# Patient Record
Sex: FEMALE | Race: OTHER | Employment: OTHER | URBAN - METROPOLITAN AREA
[De-identification: names, ages, dates, MRNs, and addresses within clinical notes are randomized per-mention and may not be internally consistent; named-entity substitution may affect disease eponyms.]

---

## 2017-01-14 ENCOUNTER — HOSPITAL ENCOUNTER (EMERGENCY)
Age: 77
Discharge: HOME | End: 2017-01-14
Attending: EMERGENCY MEDICINE
Payer: MEDICARE

## 2017-01-14 VITALS
WEIGHT: 160 LBS | SYSTOLIC BLOOD PRESSURE: 142 MMHG | RESPIRATION RATE: 18 BRPM | HEART RATE: 84 BPM | HEIGHT: 60 IN | BODY MASS INDEX: 31.41 KG/M2 | DIASTOLIC BLOOD PRESSURE: 70 MMHG | OXYGEN SATURATION: 99 % | TEMPERATURE: 97.7 F

## 2017-01-14 DIAGNOSIS — E11.65 TYPE 2 DIABETES MELLITUS WITH HYPERGLYCEMIA, WITHOUT LONG-TERM CURRENT USE OF INSULIN (HCC): Primary | ICD-10-CM

## 2017-01-14 LAB
ALBUMIN SERPL BCP-MCNC: 3.7 G/DL (ref 3.5–5)
ALBUMIN/GLOB SERPL: 0.9 {RATIO} (ref 1.1–2.2)
ALP SERPL-CCNC: 84 U/L (ref 45–117)
ALT SERPL-CCNC: 21 U/L (ref 12–78)
ANION GAP BLD CALC-SCNC: 11 MMOL/L (ref 5–15)
APPEARANCE UR: ABNORMAL
AST SERPL W P-5'-P-CCNC: 11 U/L (ref 15–37)
BACTERIA URNS QL MICRO: NEGATIVE /HPF
BASOPHILS # BLD AUTO: 0 K/UL (ref 0–0.1)
BASOPHILS # BLD: 0 % (ref 0–1)
BILIRUB SERPL-MCNC: 0.1 MG/DL (ref 0.2–1)
BILIRUB UR QL: NEGATIVE
BUN SERPL-MCNC: 23 MG/DL (ref 6–20)
BUN/CREAT SERPL: 21 (ref 12–20)
CALCIUM SERPL-MCNC: 9.3 MG/DL (ref 8.5–10.1)
CHLORIDE SERPL-SCNC: 101 MMOL/L (ref 97–108)
CK MB CFR SERPL CALC: 2 % (ref 0–2.5)
CK MB SERPL-MCNC: 1.1 NG/ML (ref 5–25)
CK SERPL-CCNC: 56 U/L (ref 26–192)
CO2 SERPL-SCNC: 25 MMOL/L (ref 21–32)
COLOR UR: ABNORMAL
CREAT SERPL-MCNC: 1.12 MG/DL (ref 0.55–1.02)
EOSINOPHIL # BLD: 0.1 K/UL (ref 0–0.4)
EOSINOPHIL NFR BLD: 1 % (ref 0–7)
EPITH CASTS URNS QL MICRO: ABNORMAL /LPF
ERYTHROCYTE [DISTWIDTH] IN BLOOD BY AUTOMATED COUNT: 13.7 % (ref 11.5–14.5)
GLOBULIN SER CALC-MCNC: 3.9 G/DL (ref 2–4)
GLUCOSE BLD STRIP.AUTO-MCNC: 204 MG/DL (ref 65–100)
GLUCOSE BLD STRIP.AUTO-MCNC: 297 MG/DL (ref 65–100)
GLUCOSE SERPL-MCNC: 304 MG/DL (ref 65–100)
GLUCOSE UR STRIP.AUTO-MCNC: >1000 MG/DL
HCT VFR BLD AUTO: 38.2 % (ref 35–47)
HGB BLD-MCNC: 12.5 G/DL (ref 11.5–16)
HGB UR QL STRIP: ABNORMAL
HYALINE CASTS URNS QL MICRO: ABNORMAL /LPF (ref 0–5)
KETONES UR QL STRIP.AUTO: NEGATIVE MG/DL
LEUKOCYTE ESTERASE UR QL STRIP.AUTO: NEGATIVE
LYMPHOCYTES # BLD AUTO: 51 % (ref 12–49)
LYMPHOCYTES # BLD: 3.2 K/UL (ref 0.8–3.5)
MCH RBC QN AUTO: 25.9 PG (ref 26–34)
MCHC RBC AUTO-ENTMCNC: 32.7 G/DL (ref 30–36.5)
MCV RBC AUTO: 79.3 FL (ref 80–99)
MONOCYTES # BLD: 0.5 K/UL (ref 0–1)
MONOCYTES NFR BLD AUTO: 8 % (ref 5–13)
NEUTS SEG # BLD: 2.6 K/UL (ref 1.8–8)
NEUTS SEG NFR BLD AUTO: 40 % (ref 32–75)
NITRITE UR QL STRIP.AUTO: NEGATIVE
PH UR STRIP: 5 [PH] (ref 5–8)
PLATELET # BLD AUTO: 215 K/UL (ref 150–400)
POTASSIUM SERPL-SCNC: 3.5 MMOL/L (ref 3.5–5.1)
PROT SERPL-MCNC: 7.6 G/DL (ref 6.4–8.2)
PROT UR STRIP-MCNC: NEGATIVE MG/DL
RBC # BLD AUTO: 4.82 M/UL (ref 3.8–5.2)
RBC #/AREA URNS HPF: ABNORMAL /HPF (ref 0–5)
SERVICE CMNT-IMP: ABNORMAL
SERVICE CMNT-IMP: ABNORMAL
SODIUM SERPL-SCNC: 137 MMOL/L (ref 136–145)
SP GR UR REFRACTOMETRY: 1.02 (ref 1–1.03)
TROPONIN I SERPL-MCNC: <0.04 NG/ML
UROBILINOGEN UR QL STRIP.AUTO: 0.2 EU/DL (ref 0.2–1)
WBC # BLD AUTO: 6.4 K/UL (ref 3.6–11)
WBC URNS QL MICRO: ABNORMAL /HPF (ref 0–4)

## 2017-01-14 PROCEDURE — 82550 ASSAY OF CK (CPK): CPT | Performed by: EMERGENCY MEDICINE

## 2017-01-14 PROCEDURE — 84484 ASSAY OF TROPONIN QUANT: CPT | Performed by: EMERGENCY MEDICINE

## 2017-01-14 PROCEDURE — 74011250636 HC RX REV CODE- 250/636: Performed by: EMERGENCY MEDICINE

## 2017-01-14 PROCEDURE — 36415 COLL VENOUS BLD VENIPUNCTURE: CPT | Performed by: EMERGENCY MEDICINE

## 2017-01-14 PROCEDURE — 96361 HYDRATE IV INFUSION ADD-ON: CPT

## 2017-01-14 PROCEDURE — 80053 COMPREHEN METABOLIC PANEL: CPT | Performed by: EMERGENCY MEDICINE

## 2017-01-14 PROCEDURE — 99284 EMERGENCY DEPT VISIT MOD MDM: CPT

## 2017-01-14 PROCEDURE — 81001 URINALYSIS AUTO W/SCOPE: CPT | Performed by: EMERGENCY MEDICINE

## 2017-01-14 PROCEDURE — 82962 GLUCOSE BLOOD TEST: CPT

## 2017-01-14 PROCEDURE — 74011636637 HC RX REV CODE- 636/637: Performed by: EMERGENCY MEDICINE

## 2017-01-14 PROCEDURE — 85025 COMPLETE CBC W/AUTO DIFF WBC: CPT | Performed by: EMERGENCY MEDICINE

## 2017-01-14 PROCEDURE — 96374 THER/PROPH/DIAG INJ IV PUSH: CPT

## 2017-01-14 RX ORDER — GLIPIZIDE 5 MG/1
7.5 TABLET ORAL 2 TIMES DAILY
Qty: 90 TAB | Refills: 3 | Status: SHIPPED | OUTPATIENT
Start: 2017-01-14 | End: 2017-03-31 | Stop reason: SDUPTHER

## 2017-01-14 RX ADMIN — INSULIN HUMAN 4 UNITS: 100 INJECTION, SOLUTION PARENTERAL at 21:10

## 2017-01-14 RX ADMIN — SODIUM CHLORIDE 1000 ML: 900 INJECTION, SOLUTION INTRAVENOUS at 21:11

## 2017-01-15 NOTE — ED PROVIDER NOTES
HPI Comments: Sharon Vargas, 78 y.o. Female with PMHx of HTN, GERD, DM, and high cholesterol presents ambulatory to ED [facility] ED with cc of high blood sugar x 2 weeks. Pt notes her BG has been in the 200's lately and reports it was 344 today. Pt also c/o of lightheadedness and blurry vision. Pt notes an increase in thirst and urinary frequency. Her current symptoms are similar to prior episodes of high blood sugar. She is currently taking 1000 mg metformin BID and Glipizide 5 mg BID. She denies any recent medication changes, noting the last time it was changed was ~1 year ago. She denies any dysuria, CP, SOB, nausea, vomiting, diarrhea, or abdominal pain. She sees Dr. Evan Couch with endocrinology. PCP: Ap Moore MD    Social history significant for: - Tobacco, - EtOH, - Illicit Drug Use  PSHx: hiatal hernia, colonoscopy    There are no other complaints, changes, or physical findings at this time. Written by Silverio Rivera ED Sc[employer], as dictated by Gene Potter MD.      The history is provided by the patient. No  was used.         Past Medical History:   Diagnosis Date    Diabetes ([address]     Family history of colon cancer in mother 2016     Mother, over age [de-identified]    Gastrointestinal disorder      GERD    Hypertension     Other ill-defined conditions(799.89)      High Cholesterol    Screen for colon cancer 2016       Past Surgical History:   Procedure Laterality Date    Hx gi       hiatal hernia     N/A 2016     COLONOSCOPY performed by Jia Vanessa MD at \[facility]\"" ENDOSCOPY         Family History:   Problem Relation Age of Onset    Diabetes Mother     Liver Disease Mother     Hypertension Father     Lung Disease Father      COPD    Heart Disease Sister       of MI    Diabetes Brother     Kidney Disease Brother     Hypertension Brother     Diabetes Sister        Social History     Social History    Marital status: SINGLE     Spouse name: N/A    Number of children: N/A    Years of education: N/A     Occupational History    Not on file. Social History Main Topics    Smoking status: Never Smoker    Smokeless tobacco: Never Used    Alcohol use No    Drug use: No    Sexual activity: No     Other Topics Concern    Not on file     Social History Narrative         ALLERGIES: Review of patient's allergies indicates no known allergies. Review of Systems   Constitutional: Negative for activity change, chills and fever. HENT: Negative for congestion and sore throat. Eyes: Positive for visual disturbance (blurred vision). Negative for pain and redness. Respiratory: Negative for cough, chest tightness and shortness of breath. Cardiovascular: Negative for chest pain and palpitations. Gastrointestinal: Negative for abdominal pain, diarrhea, nausea and vomiting. Endocrine: Positive for polydipsia. Genitourinary: Positive for frequency. Negative for dysuria and urgency. Musculoskeletal: Negative for back pain and neck pain. Skin: Negative for rash. Neurological: Positive for light-headedness. Negative for syncope and headaches. Psychiatric/Behavioral: Negative for confusion. All other systems reviewed and are negative. Patient Vitals for the past 12 hrs:   Temp Pulse Resp BP SpO2   01/14/17 2100 - 86 18 128/83 98 %   01/14/17 2045 - 88 13 119/85 98 %   01/14/17 2032 97.7 °F (36.5 °C) 88 16 149/87 99 %   01/14/17 2030 - 90 15 149/87 99 %         Physical Exam   Constitutional: She is oriented to person, place, and time. She appears well-developed and well-nourished. No distress. HENT:   Head: Normocephalic. Nose: Nose normal.   Mouth/Throat: Oropharynx is clear and moist. No oropharyngeal exudate. Eyes: Conjunctivae are normal. Pupils are equal, round, and reactive to light. No scleral icterus. Neck: Normal range of motion. Neck supple. No JVD present. No tracheal deviation present. No thyromegaly present.    Cardiovascular: Normal rate, regular rhythm and intact distal pulses. Exam reveals no gallop and no friction rub. No murmur heard. Pulmonary/Chest: Effort normal and breath sounds normal. No stridor. No respiratory distress. She has no wheezes. She has no rales. Abdominal: Soft. Bowel sounds are normal. She exhibits no distension. There is no tenderness. There is no rebound and no guarding. Musculoskeletal: Normal range of motion. She exhibits no edema. Lymphadenopathy:     She has no cervical adenopathy. Neurological: She is alert and oriented to person, place, and time. No cranial nerve deficit. She exhibits normal muscle tone. Coordination normal.   Skin: Skin is warm and dry. No rash noted. She is not diaphoretic. No erythema. Psychiatric: She has a normal mood and affect. Her behavior is normal.   Nursing note and vitals reviewed.        MDM  Number of Diagnoses or Management Options  Type 2 diabetes mellitus with hyperglycemia, without long-term current use of insulin [facility]):   Diagnosis management comments: DDx: DKA, hyperglycemia, dehydration, UTI, metabolic abnormality       Amount and/or Complexity of Data Reviewed  Clinical lab tests: ordered and reviewed  Review and summarize past medical records: yes    Risk of Complications, Morbidity, and/or Mortality  General comments: Pt well appearing; no anion gap; ua negative; no neuro deficits; sx resolved after blood sugar control and ivf; will increase glipizide from 5 bid to 7.5 bid; Jose Garza MD    Patient Progress  Patient progress: stable    ED Course       Procedures    LABORATORY TESTS:  Recent Results (from the past 12 hour(s))   GLUCOSE, POC    Collection Time: 01/14/17  8:31 PM   Result Value Ref Range    Glucose (POC) 297 (H) 65 - 100 mg/dL    Performed by Elieser Velásquez    CBC WITH AUTOMATED DIFF    Collection Time: 01/14/17  8:47 PM   Result Value Ref Range    WBC 6.4 3.6 - 11.0 K/uL    RBC 4.82 3.80 - 5.20 M/uL    HGB 12.5 11.5 - 16.0 g/dL    HCT 38.2 35.0 - 47.0 %    MCV 79.3 (L) 80.0 - 99.0 FL    MCH 25.9 (L) 26.0 - 34.0 PG    MCHC 32.7 30.0 - 36.5 g/dL    RDW 13.7 11.5 - 14.5 %    PLATELET 710 915 - 466 K/uL    NEUTROPHILS 40 32 - 75 %    LYMPHOCYTES 51 (H) 12 - 49 %    MONOCYTES 8 5 - 13 %    EOSINOPHILS 1 0 - 7 %    BASOPHILS 0 0 - 1 %    ABS. NEUTROPHILS 2.6 1.8 - 8.0 K/UL    ABS. LYMPHOCYTES 3.2 0.8 - 3.5 K/UL    ABS. MONOCYTES 0.5 0.0 - 1.0 K/UL    ABS. EOSINOPHILS 0.1 0.0 - 0.4 K/UL    ABS. BASOPHILS 0.0 0.0 - 0.1 K/UL   METABOLIC PANEL, COMPREHENSIVE    Collection Time: 01/14/17  8:47 PM   Result Value Ref Range    Sodium 137 136 - 145 mmol/L    Potassium 3.5 3.5 - 5.1 mmol/L    Chloride 101 97 - 108 mmol/L    CO2 25 21 - 32 mmol/L    Anion gap 11 5 - 15 mmol/L    Glucose 304 (H) 65 - 100 mg/dL    BUN 23 (H) 6 - 20 MG/DL    Creatinine 1.12 (H) 0.55 - 1.02 MG/DL    BUN/Creatinine ratio 21 (H) 12 - 20      GFR est AA 58 (L) >60 ml/min/1.73m2    GFR est non-AA 48 (L) >60 ml/min/1.73m2    Calcium 9.3 8.5 - 10.1 MG/DL    Bilirubin, total 0.1 (L) 0.2 - 1.0 MG/DL    ALT 21 12 - 78 U/L    AST 11 (L) 15 - 37 U/L    Alk. phosphatase 84 45 - 117 U/L    Protein, total 7.6 6.4 - 8.2 g/dL    Albumin 3.7 3.5 - 5.0 g/dL    Globulin 3.9 2.0 - 4.0 g/dL    A-G Ratio 0.9 (L) 1.1 - 2.2     CK W/ CKMB & INDEX    Collection Time: 01/14/17  8:47 PM   Result Value Ref Range    CK 56 26 - 192 U/L    CK - MB 1.1 <3.6 NG/ML    CK-MB Index 2.0 0 - 2.5     TROPONIN I    Collection Time: 01/14/17  8:47 PM   Result Value Ref Range    Troponin-I, Qt. <0.04 <0.05 ng/mL         MEDICATIONS GIVEN:  Medications   sodium chloride 0.9 % bolus infusion 1,000 mL (1,000 mL IntraVENous New Bag 1/14/17 2111)   insulin regular (NOVOLIN R, HUMULIN R) injection 4 Units (4 Units IntraVENous Given 1/14/17 2110)       IMPRESSION:  1. Type 2 diabetes mellitus with hyperglycemia, without long-term current use of insulin (HCC)        PLAN:  1.    Current Discharge Medication List      CONTINUE these medications which have CHANGED    Details   glipiZIDE (GLUCOTROL) 5 mg tablet Take 1.5 Tabs by mouth two (2) times a day. Qty: 90 Tab, Refills: 3         CONTINUE these medications which have NOT CHANGED    Details   aspirin delayed-release 81 mg tablet Take 81 mg by mouth daily. amLODIPine (NORVASC) 10 mg tablet daily. hydrochlorothiazide (HYDRODIURIL) 12.5 mg tablet 12.5 mg daily. valsartan (DIOVAN) 160 mg tablet 160 mg daily. metFORMIN ER (GLUCOPHAGE XR) 500 mg tablet Take 1,000 mg by mouth two (2) times a day. levothyroxine (SYNTHROID) 50 mcg tablet Take 50 mcg by mouth Daily (before breakfast). ketoconazole (NIZORAL) 2 % shampoo as needed. Refills: 0      rosuvastatin (CRESTOR) 20 mg tablet Take 20 mg by mouth nightly. esomeprazole (NEXIUM) 40 mg capsule Take 40 mg by mouth daily. 2.   Follow-up Information     Follow up With Details Comments [address], [place] Schedule an appointment as soon as possible for a visit in 3 days  [address] [place] [address] Diabetes Endocrinology  [address] [address] [phone]          Return to ED if worse     Discharge Note:  10:31 PM  The pt is ready for discharge. The pt's signs, symptoms, diagnosis, and discharge instructions have been discussed and pt has conveyed their understanding. The pt is to follow up as recommended or return to ER should their symptoms worsen. Plan has been discussed and pt is in agreement. This note is prepared by Luis Jimenez, acting as a Scribe for Ailyn Meyers MD.    Ailyn Meyers MD: The scribe's documentation has been prepared under my direction and personally reviewed by me in its entirety. I confirm that the notes above accurately reflects all work, treatment, procedures, and medical decision making performed by me.

## 2017-01-15 NOTE — ED NOTES
Patient received discharge instructions and verbalized understanding. Patient discharged ambulatory out of ED in no apparent distress, vital signs stable.

## 2017-01-16 ENCOUNTER — NURSE NAVIGATOR (OUTPATIENT)
Dept: ENDOCRINOLOGY | Age: 77
End: 2017-01-16

## 2017-01-16 NOTE — PROGRESS NOTES
Nurse Navigator, Mylene Roberson RN  Post ED discharge note: 17    Spoke with patient today, verified  and address. Patient discharged on 17 from [facility], treated for hyperglycemia. Patient states doing okay today, no problems or concerns noted. Reviewed home medications, patient verbalizes understanding of self management of medications. Reviewed red flags, patient understands when to seek medical attention from PCP, specialist or emergency medical services. Emergency plan verbalized. Patients next follow up visit is scheduled for 16. Patient verbalizes understanding of discharge plan and has nurse navigator's contact information to call, as needed. No other clinical/social/functional needs noted. Reviewed plan of care. Patient has provided input to plan and agrees with goals. Will continue to follow pt as needed.     Red flags: chest pain or discomfort, shortness of breath, sudden numbness or weakness, sudden trouble with seeing, walking or balancing, sudden severe headache

## 2017-01-16 NOTE — LETTER
1/16/2017 10:19 AM 
 
Ms. [phone] Dear Ms. Nelson Zacarias: I am a RN Nurse Navigator working with [address] [place] [address] Endocrinology. Part of my job is to follow up with patients who have been in the emergency department, hospital, or have a chronic disease. I check to see how you are feeling, answer any questions you may have about your health and check to see if you have a follow-up appointment to see your primary care physician. If you have changed your Primary Care Provider, let us know so we can update our records. Otherwise, I am available to help provide education and resources for your needs. I can be reached Monday thru Friday at [phone] or [phone]. Your next appointment with Dr. Erin Conti is scheduled for Friday, January 27, 2017 at 3:00 pm. 
 
Thank you for allowing me to participate in your care! Sincerely, Milagro Pinzon RN Enclosure(s) Home Blood Sugar Monitoring Record Please call, email, mail or fax your most recent blood sugar readings to office for review. Fax number is [phone] or [phone]. Thank you.

## 2017-01-27 ENCOUNTER — OFFICE VISIT (OUTPATIENT)
Dept: ENDOCRINOLOGY | Age: 77
End: 2017-01-27

## 2017-01-27 VITALS
SYSTOLIC BLOOD PRESSURE: 124 MMHG | BODY MASS INDEX: 31.37 KG/M2 | HEIGHT: 60 IN | WEIGHT: 159.8 LBS | HEART RATE: 83 BPM | DIASTOLIC BLOOD PRESSURE: 84 MMHG

## 2017-01-27 DIAGNOSIS — E78.5 HYPERLIPIDEMIA, UNSPECIFIED HYPERLIPIDEMIA TYPE: ICD-10-CM

## 2017-01-27 DIAGNOSIS — E03.9 HYPOTHYROIDISM (ACQUIRED): ICD-10-CM

## 2017-01-27 DIAGNOSIS — I10 ESSENTIAL HYPERTENSION WITH GOAL BLOOD PRESSURE LESS THAN 140/90: ICD-10-CM

## 2017-01-27 DIAGNOSIS — E11.9 TYPE 2 DIABETES MELLITUS WITHOUT COMPLICATION, WITHOUT LONG-TERM CURRENT USE OF INSULIN (HCC): Primary | ICD-10-CM

## 2017-01-27 RX ORDER — ROSUVASTATIN CALCIUM 10 MG/1
TABLET, COATED ORAL
COMMUNITY
Start: 2016-11-21

## 2017-01-27 RX ORDER — OMEPRAZOLE 20 MG/1
40 CAPSULE, DELAYED RELEASE ORAL DAILY
COMMUNITY
Start: 2016-11-21

## 2017-01-27 NOTE — PATIENT INSTRUCTIONS
1) Continue the Metformin two pills at bedtime. 2) Glipizide. Take 1-1/2 pills with \"brunch\" and and WITH DINNER. 3) Check your blood sugars in the morning and either before dinner or at bedtime. Mail your blood sugar logs to me in 3 weeks.

## 2017-01-27 NOTE — MR AVS SNAPSHOT
Visit Information Date & Time Provider Department Dept. Phone Encounter #  
 1/27/2017  3:00 PM Nuno Rinaldi, [address] [place] [address] Diabetes and Endocrinology [phone] [id number] Follow-up Instructions Return in about 3 months (around 4/27/2017). Upcoming Health Maintenance Date Due DTaP/Tdap/Td series (1 - Tdap) 4/28/1963 FOBT Q 1 YEAR AGE 50-75 4/28/1992 ZOSTER VACCINE AGE 60> 4/28/2002 OSTEOPOROSIS SCREENING (DEXA) 4/28/2007 Pneumococcal 65+ Low/Medium Risk (1 of 2 - PCV13) 4/28/2007 MEDICARE YEARLY EXAM 4/28/2007 EYE EXAM RETINAL OR DILATED Q1 12/16/2015 INFLUENZA AGE 9 TO ADULT 8/1/2016 MICROALBUMIN Q1 8/28/2016 LIPID PANEL Q1 8/28/2016 HEMOGLOBIN A1C Q6M 11/12/2016 GLAUCOMA SCREENING Q2Y 12/16/2016 FOOT EXAM Q1 5/12/2017 Allergies as of 1/27/2017  Review Complete On: 1/27/2017 By: Nuno Rinaldi MD  
 No Known Allergies Current Immunizations  Reviewed on 4/27/2015 No immunizations on file. Not reviewed this visit You Were Diagnosed With   
  
 Codes Comments Type 2 diabetes mellitus without complication, without long-term current use of insulin (HCC)    -  Primary ICD-10-CM: E11.9 ICD-9-CM: 250.00 Hypothyroidism (acquired)     ICD-10-CM: E03.9 ICD-9-CM: 244.9 Essential hypertension with goal blood pressure less than 140/90     ICD-10-CM: I10 
ICD-9-CM: 401.9 Vitals BP Pulse Height(growth percentile) Weight(growth percentile) BMI OB Status 124/84 83 5' (1.524 m) 159 lb 12.8 oz (72.5 kg) 31.21 kg/m2 Postmenopausal  
 Smoking Status Never Smoker Vitals History BMI and BSA Data Body Mass Index Body Surface Area  
 31.21 kg/m 2 1.75 m 2 Preferred Pharmacy Pharmacy Name Phone [facility]  [address] [facility] [address] [phone] Your Updated Medication List  
  
   
 This list is accurate as of: 1/27/17  3:43 PM.  Always use your most recent med list. amLODIPine 10 mg tablet Commonly known as:  NORVASC  
daily. aspirin delayed-release 81 mg tablet Take 81 mg by mouth daily. glipiZIDE 5 mg tablet Commonly known as:  Maxcine Ryder Take 1.5 Tabs by mouth two (2) times a day. hydroCHLOROthiazide 12.5 mg tablet Commonly known as:  HYDRODIURIL  
12.5 mg daily. ketoconazole 2 % shampoo Commonly known as:  NIZORAL  
as needed. levothyroxine 50 mcg tablet Commonly known as:  SYNTHROID Take 50 mcg by mouth Daily (before breakfast). metFORMIN  mg tablet Commonly known as:  GLUCOPHAGE XR Take 500 mg by mouth two (2) times a day. NexIUM 40 mg capsule Generic drug:  esomeprazole Take 40 mg by mouth daily. omeprazole 20 mg capsule Commonly known as:  PRILOSEC  
  
 rosuvastatin 10 mg tablet Commonly known as:  CRESTOR  
  
 valsartan 160 mg tablet Commonly known as:  DIOVAN  
160 mg daily. We Performed the Following HEMOGLOBIN A1C WITH EAG [20642 CPT(R)] MT COLLECTION VENOUS BLOOD,VENIPUNCTURE [id number] CPT(R)] MT HANDLG&/OR CONVEY OF SPEC FOR TR OFFICE TO LAB [15411 CPT(R)] T4, FREE [id number] CPT(R)] TSH 3RD GENERATION [68726 CPT(R)] Follow-up Instructions Return in about 3 months (around 4/27/2017). Patient Instructions 1) Continue the Metformin two pills at bedtime. 2) Glipizide. Take 1-1/2 pills with \"brunch\" and and WITH DINNER. 3) Check your blood sugars in the morning and either before dinner or at bedtime. Mail your blood sugar logs to me in 3 weeks. Introducing hospitals & HEALTH SERVICES! Lexi Somers introduces [employer] patient portal. Now you can access parts of your medical record, email your doctor's office, and request medication refills online. 1. In your internet browser, go to [email or web address] 2. Click on the First Time User? Click Here link in the Sign In box. You will see the New Member Sign Up page. 3. Enter your [facility] Access Code exactly as it appears below. You will not need to use this code after youve completed the sign-up process. If you do not sign up before the expiration date, you must request a new code. · [employer] Access Code: [email or web address] Expires: 4/14/2017  8:56 PM 
 
4. Enter the last four digits of your Social Security Number (xxxx) and Date of Birth (mm/dd/yyyy) as indicated and click Submit. You will be taken to the next sign-up page. 5. Create a [facility] ID. This will be your [facility] login ID and cannot be changed, so think of one that is secure and easy to remember. 6. Create a [facility] [facility] password. You can change your password at any time. 7. Enter your Password Reset Question and Answer. This can be used at a later time if you forget your password. 8. Enter your e-mail address. You will receive e-mail notification when new information is available in [address] 9. Click Sign Up. You can now view and download portions of your medical record. 10. Click the Download Summary menu link to download a portable copy of your medical information. If you have questions, please visit the Frequently Asked Questions section of the [employer] [facility] website. Remember, [facility] [facility] is NOT to be used for urgent needs. For medical emergencies, dial 911. Now available from your iPhone and Android! Please provide this summary of care documentation to your next provider. Your primary care clinician is listed as Ajay Dunn. If you have any questions after today's visit, please call [phone].

## 2017-01-27 NOTE — PROGRESS NOTES
Chief Complaint   Patient presents with    Diabetes     pcp and pharmacy verified. Release signed for eye exam   Records since last visit reviewed  History of Present Illness: Vitaly Bender is a 78 y.o. female here for 2 week hospital follow up of diabetes and hypothyroidism. She was diagnosed with diabetes around 2012. Pt presented to the ED on 1/15/17 complaining of lightheadedness for 2 weeks with BGs in the 200-300's. Pt reported that she has been taking Metformin 1000mg BID and Glipizide 5mg BID. Our Nurse Navigator called her and set up this appointment. At discharge she was instructed to increase her Glipizide to 7.5mg BID. At our last visit in May 2016 pt was taking Metformin XR 500mg BID with Glipizide 5mg BID. Her A1C had improved from 13.9% down to 9.7%. She has had 2 cancellations and a No Show since that time. Pt notes that her BGs \"were doing very well till two weeks before I was in the ED\" She notes that there had been a death in the family and she has been under a lot of stress. She was eating a lot of fast food at the hospital and not following her regular diet and routine. Pt notes that she is taking the Metformin XR 1000 HS (any more causes GI upset) and Glipizide 7.5mg with lunch and at bedtime. She ran out of test strips so she has not been checking her BGs. She was taking them HS, but has not been taking them since she came to the ED. She denies any issues of hypoglycemia. Pt is eating 2 meals per day. She will have \"bruch\" around 1230 PM, yesterday she had an english muffing, macario, a banana and coffee. She will occasionally have a piece of fruit or yogurt in the afternoon  She has dinner around 7PM, last night she had shrimp and vegetables and crystal lite. Pt goes to bed 11PM.  Her largest meal of the day tends to be dinner. Exercise consists of walking in the morning (hour) and house work. No history of vascular disease.  No history of neuropathy, or nephropathy. Last eye exam was March 2016, no retinopathy, will request these records. Pt has hx of cataracts removed in 2012. Pt was diagnosed with hypothyroidism in May 2015 by her PCP. She was on LT4 50mcg in February 2016 and her TFTs were at goal.  She denies issues of palpitations, tremors, CP, SOB, heat or cold intolerance, diarrhea or constipation. Pt had a colonoscopy in May 2016. She was found to have diverticulosis but no concerning findings. Current Outpatient Prescriptions   Medication Sig    rosuvastatin (CRESTOR) 10 mg tablet     omeprazole (PRILOSEC) 20 mg capsule     glipiZIDE (GLUCOTROL) 5 mg tablet Take 1.5 Tabs by mouth two (2) times a day.  amLODIPine (NORVASC) 10 mg tablet daily.  hydrochlorothiazide (HYDRODIURIL) 12.5 mg tablet 12.5 mg daily.  valsartan (DIOVAN) 160 mg tablet 160 mg daily.  metFORMIN ER (GLUCOPHAGE XR) 500 mg tablet Take 500 mg by mouth two (2) times a day.  levothyroxine (SYNTHROID) 50 mcg tablet Take 50 mcg by mouth Daily (before breakfast).  ketoconazole (NIZORAL) 2 % shampoo as needed.  aspirin delayed-release 81 mg tablet Take 81 mg by mouth daily.  esomeprazole (NEXIUM) 40 mg capsule Take 40 mg by mouth daily. No current facility-administered medications for this visit. No Known Allergies  Review of Systems:  - Eyes: no blurry vision or double vision  - Cardiovascular: no chest pain  - Respiratory: no shortness of breath  - Musculoskeletal: no myalgias  - Neurological: no numbness/tingling in extremities    Physical Examination:  Blood pressure 124/84, pulse 83, height 5' (1.524 m), weight 159 lb 12.8 oz (72.5 kg).   - General: pleasant, no distress, good eye contact   - Neck: no carotid bruits  - Cardiovascular: regular, normal rate, nl s1 and s2, no m/r/g, 2+ DP pulses   - Respiratory: clear bilaterally  - Integumentary: no edema, no foot ulcers, sensation to monofilament and vibration intact bilaterally  - Psychiatric: normal mood and affect    Data Reviewed:   Results for orders placed or performed during the hospital encounter of 01/14/17   CBC WITH AUTOMATED DIFF   Result Value Ref Range    WBC 6.4 3.6 - 11.0 K/uL    RBC 4.82 3.80 - 5.20 M/uL    HGB 12.5 11.5 - 16.0 g/dL    HCT 38.2 35.0 - 47.0 %    MCV 79.3 (L) 80.0 - 99.0 FL    MCH 25.9 (L) 26.0 - 34.0 PG    MCHC 32.7 30.0 - 36.5 g/dL    RDW 13.7 11.5 - 14.5 %    PLATELET 239 189 - 767 K/uL    NEUTROPHILS 40 32 - 75 %    LYMPHOCYTES 51 (H) 12 - 49 %    MONOCYTES 8 5 - 13 %    EOSINOPHILS 1 0 - 7 %    BASOPHILS 0 0 - 1 %    ABS. NEUTROPHILS 2.6 1.8 - 8.0 K/UL    ABS. LYMPHOCYTES 3.2 0.8 - 3.5 K/UL    ABS. MONOCYTES 0.5 0.0 - 1.0 K/UL    ABS. EOSINOPHILS 0.1 0.0 - 0.4 K/UL    ABS. BASOPHILS 0.0 0.0 - 0.1 K/UL   METABOLIC PANEL, COMPREHENSIVE   Result Value Ref Range    Sodium 137 136 - 145 mmol/L    Potassium 3.5 3.5 - 5.1 mmol/L    Chloride 101 97 - 108 mmol/L    CO2 25 21 - 32 mmol/L    Anion gap 11 5 - 15 mmol/L    Glucose 304 (H) 65 - 100 mg/dL    BUN 23 (H) 6 - 20 MG/DL    Creatinine 1.12 (H) 0.55 - 1.02 MG/DL    BUN/Creatinine ratio 21 (H) 12 - 20      GFR est AA 58 (L) >60 ml/min/1.73m2    GFR est non-AA 48 (L) >60 ml/min/1.73m2    Calcium 9.3 8.5 - 10.1 MG/DL    Bilirubin, total 0.1 (L) 0.2 - 1.0 MG/DL    ALT 21 12 - 78 U/L    AST 11 (L) 15 - 37 U/L    Alk.  phosphatase 84 45 - 117 U/L    Protein, total 7.6 6.4 - 8.2 g/dL    Albumin 3.7 3.5 - 5.0 g/dL    Globulin 3.9 2.0 - 4.0 g/dL    A-G Ratio 0.9 (L) 1.1 - 2.2     CK W/ CKMB & INDEX   Result Value Ref Range    CK 56 26 - 192 U/L    CK - MB 1.1 <3.6 NG/ML    CK-MB Index 2.0 0 - 2.5     TROPONIN I   Result Value Ref Range    Troponin-I, Qt. <0.04 <0.05 ng/mL   URINALYSIS W/MICROSCOPIC   Result Value Ref Range    Color YELLOW/STRAW      Appearance CLOUDY (A) CLEAR      Specific gravity 1.019 1.003 - 1.030      pH (UA) 5.0 5.0 - 8.0      Protein NEGATIVE  NEG mg/dL    Glucose >1000 (A) NEG mg/dL    Ketone NEGATIVE  NEG mg/dL    Bilirubin NEGATIVE  NEG      Blood TRACE (A) NEG      Urobilinogen 0.2 0.2 - 1.0 EU/dL    Nitrites NEGATIVE  NEG      Leukocyte Esterase NEGATIVE  NEG      WBC 0-4 0 - 4 /hpf    RBC 0-5 0 - 5 /hpf    Epithelial cells FEW FEW /lpf    Bacteria NEGATIVE  NEG /hpf    Hyaline Cast 0-2 0 - 5 /lpf   GLUCOSE, POC   Result Value Ref Range    Glucose (POC) 297 (H) 65 - 100 mg/dL    Performed by Mertrentyn Supriya    GLUCOSE, POC   Result Value Ref Range    Glucose (POC) 204 (H) 65 - 100 mg/dL    Performed by Meka Stahl          Assessment/Plan:   1) DM > Will have pt continue the Metformin XR 1000mg HS. Pt instructed to take her Glipizide 7.5mg with brunch and WITH DINNER. Pt to check her BG twice daily and mail her BG logs to me in 3 weeks. Will check a Fructosamine. 2) Hypothyroidism > Pt is clinically euthyroid on LT4 50mcg daily. Will check her TFTs today and adjust her dose as needed. 3) HTN > BP at goal.    4) HLD > Pt is on Rosuvastatin 10mg daily which she is tolerating well. Pt voices understanding and agreement with the plan. We spent 40 minutes of face to face time together and > 50% of the time was spent in counseling on the above issues. RTC 3 months    Patient Instructions   1) Continue the Metformin two pills at bedtime. 2) Glipizide. Take 1-1/2 pills with \"brunch\" and and WITH DINNER. 3) Check your blood sugars in the morning and either before dinner or at bedtime. Mail your blood sugar logs to me in 3 weeks. Follow-up Disposition:  Return in about 3 months (around 4/27/2017).     Copy sent to:  Dr. Jay Otto

## 2017-01-28 LAB
FRUCTOSAMINE SERPL-SCNC: 398 UMOL/L (ref 0–285)
T4 FREE SERPL-MCNC: 1.35 NG/DL (ref 0.82–1.77)
TSH SERPL DL<=0.005 MIU/L-ACNC: 3 UIU/ML (ref 0.45–4.5)

## 2017-01-30 NOTE — PROGRESS NOTES
Your thyroid labs came back and they look good. Continue the Levothyroxine 50mcg daily. Your Fructosamine was 400, this is equivalent to an average blood sugar of 250 for the last 2 week. Make the changes we discussed and let me know how your sugars are doing in 3 weeks.

## 2017-02-01 ENCOUNTER — TELEPHONE (OUTPATIENT)
Dept: ENDOCRINOLOGY | Age: 77
End: 2017-02-01

## 2017-02-22 LAB — LDL-C, EXTERNAL: 56

## 2017-03-31 RX ORDER — GLIPIZIDE 5 MG/1
7.5 TABLET ORAL 2 TIMES DAILY
Qty: 270 TAB | Refills: 1 | Status: SHIPPED | OUTPATIENT
Start: 2017-03-31 | End: 2017-06-30 | Stop reason: SDUPTHER

## 2017-06-30 ENCOUNTER — OFFICE VISIT (OUTPATIENT)
Dept: ENDOCRINOLOGY | Age: 77
End: 2017-06-30

## 2017-06-30 VITALS
BODY MASS INDEX: 30.19 KG/M2 | SYSTOLIC BLOOD PRESSURE: 116 MMHG | HEART RATE: 87 BPM | DIASTOLIC BLOOD PRESSURE: 66 MMHG | HEIGHT: 60 IN | WEIGHT: 153.8 LBS

## 2017-06-30 DIAGNOSIS — I10 ESSENTIAL HYPERTENSION WITH GOAL BLOOD PRESSURE LESS THAN 140/90: ICD-10-CM

## 2017-06-30 DIAGNOSIS — E78.5 HYPERLIPIDEMIA, UNSPECIFIED HYPERLIPIDEMIA TYPE: ICD-10-CM

## 2017-06-30 DIAGNOSIS — E11.9 TYPE 2 DIABETES MELLITUS WITHOUT COMPLICATION, WITHOUT LONG-TERM CURRENT USE OF INSULIN (HCC): Primary | ICD-10-CM

## 2017-06-30 DIAGNOSIS — E03.9 HYPOTHYROIDISM (ACQUIRED): ICD-10-CM

## 2017-06-30 LAB — HBA1C MFR BLD HPLC: 8.8 %

## 2017-06-30 RX ORDER — GLIPIZIDE 10 MG/1
10 TABLET ORAL 2 TIMES DAILY
Qty: 30 TAB | Refills: 3 | Status: SHIPPED | OUTPATIENT
Start: 2017-06-30 | End: 2017-07-07 | Stop reason: SDUPTHER

## 2017-06-30 NOTE — PATIENT INSTRUCTIONS
1) Increase your Glipzide to 10mg daily. Take two of the 5mg tablets with lunch and two with dinner. I am going to change your prescription of the Glipizide to a 10mg tablet so when you get your refill the dose will be higher and you will only take one pill of the 10mg tablet with lunch and one pill of the 10mg with dinner.

## 2017-06-30 NOTE — PROGRESS NOTES
Chief Complaint   Patient presents with    Diabetes     pcp . and pharmacy verified. Release signed for eye exam.    Records since last visit reviewed  History of Present Illness: Isaias Santiago is a 78 y.o. female here for follow up of diabetes and hypothyroidism. She was diagnosed with diabetes around 2012. At our last visit her Fructosamine was 400 which is equivalent to an average BG of 250. She was taking Metformin 1000mg BID and Glipizide 5mg BID. Pt was instructed to increase her Glipizide to 7.5mg BID and follow up with me in 3 months. Pt has not been back to see me since January 2017. Her A1C today was 8.8%. She has lost 7 pounds since our last visit. Pt is taking the Glipizide 7.5mg BID and Metformin 1000mg BID. She checks her BGs about 3 times per week, mostly at night. She notes it will run in the 130-170's  She denies issues of hypoglycemia. She denies recent issues of illness, injuries or hospitalizations. Pt is eating 2 meals per day. She will have \"bruch\" around 130-2 PM, she notes she will get up and go to the gym before she eats. Yesterday she had two turkey sandwiches and Katya-Aid with equal.  She will occasionally have a piece of fruit, nabs or yogurt in the afternoon  She has dinner around 7PM, last night she had chicken wings, 4 nabs, katya-aid and a banana. Pt goes to bed 11PM.  Pt notes she wakes up every morning around 3AM and has to go to the restroom. Exercise consists of walking in the morning (hour) and house work. No history of vascular disease. No history of neuropathy, or nephropathy. Last eye exam was March 2016, no retinopathy, will request these records. Pt has hx of cataracts removed in 2012. Pt was diagnosed with hypothyroidism in May 2015 by her PCP. She was on LT4 50mcg in February 2016 and her TFTs were at goal.  She denies issues of palpitations, tremors, CP, SOB, heat or cold intolerance, diarrhea or constipation.     Pt had a colonoscopy in May 2016. She was found to have diverticulosis but no concerning findings. Current Outpatient Prescriptions   Medication Sig    glipiZIDE (GLUCOTROL) 10 mg tablet Take 1 Tab by mouth two (2) times a day. One pill with lunch and one pill with dinner  (New directions)    rosuvastatin (CRESTOR) 10 mg tablet nightly.  omeprazole (PRILOSEC) 20 mg capsule     amLODIPine (NORVASC) 10 mg tablet daily.  hydrochlorothiazide (HYDRODIURIL) 12.5 mg tablet 12.5 mg daily.  valsartan (DIOVAN) 160 mg tablet 160 mg daily.  metFORMIN ER (GLUCOPHAGE XR) 500 mg tablet Take 500 mg by mouth two (2) times a day.  levothyroxine (SYNTHROID) 50 mcg tablet Take 50 mcg by mouth Daily (before breakfast).  ketoconazole (NIZORAL) 2 % shampoo as needed.  aspirin delayed-release 81 mg tablet Take 81 mg by mouth daily. No current facility-administered medications for this visit. No Known Allergies  Review of Systems:  - Eyes: no blurry vision or double vision  - Cardiovascular: no chest pain  - Respiratory: no shortness of breath  - Musculoskeletal: no myalgias  - Neurological: no numbness/tingling in extremities    Physical Examination:  Blood pressure 116/66, pulse 87, height 5' (1.524 m), weight 153 lb 12.8 oz (69.8 kg). - General: pleasant, no distress, good eye contact   - Neck: no carotid bruits  - Cardiovascular: regular, normal rate, nl s1 and s2, no m/r/g, 2+ DP pulses   - Respiratory: clear bilaterally  - Integumentary: no edema, no foot ulcers, sensation to monofilament and vibration intact bilaterally  - Psychiatric: normal mood and affect    Data Reviewed:   Results for orders placed or performed in visit on 03/03/17   AMB EXT LDL-C   Result Value Ref Range    LDL-C, External 56      Her A1C today was 8.8%. Assessment/Plan:   1) DM > Her A1C today was 8.8%. Pt is not having any hypoglycemia. Our goal A1C is 8.0% or less will increase her Glipizide to 10mg BID and continue the Metformin 1000mg BID. Pt to check her BG twice daily and mail her BG logs to me in 3 weeks. 2) Hypothyroidism > Pt is clinically euthyroid on LT4 50mcg daily. Will check her TFTs today and adjust her dose as needed. 3) HTN > BP at goal.    4) HLD > Pt is on Rosuvastatin 10mg daily which she is tolerating well. Her LDL in March 2017 was at goal.    Pt voices understanding and agreement with the plan. We spent 40 minutes of face to face time together and > 50% of the time was spent in counseling on the above issues. RTC 3 months    Patient Instructions   1) Increase your Glipzide to 10mg daily. Take two of the 5mg tablets with lunch and two with dinner. I am going to change your prescription of the Glipizide to a 10mg tablet so when you get your refill the dose will be higher and you will only take one pill of the 10mg tablet with lunch and one pill of the 10mg with dinner. Follow-up Disposition:  Return in about 3 months (around 9/30/2017).     Copy sent to:  Dr. Aaron Spindle

## 2017-06-30 NOTE — MR AVS SNAPSHOT
Visit Information Date & Time Provider Department Dept. Phone Encounter #  
 6/30/2017 10:50 AM Yoshi Rosenbaum MD [facility] [phone] [id number] Follow-up Instructions Return in about 3 months (around 9/30/2017). Upcoming Health Maintenance Date Due DTaP/Tdap/Td series (1 - Tdap) 4/28/1961 ZOSTER VACCINE AGE 60> 4/28/2002 OSTEOPOROSIS SCREENING (DEXA) 4/28/2007 Pneumococcal 65+ Low/Medium Risk (1 of 2 - PCV13) 4/28/2007 MEDICARE YEARLY EXAM 4/28/2007 MICROALBUMIN Q1 8/28/2016 HEMOGLOBIN A1C Q6M 11/12/2016 FOOT EXAM Q1 5/12/2017 EYE EXAM RETINAL OR DILATED Q1 6/3/2017 INFLUENZA AGE 9 TO ADULT 8/1/2017 LIPID PANEL Q1 3/2/2018 GLAUCOMA SCREENING Q2Y 6/3/2018 Allergies as of 6/30/2017  Review Complete On: 6/30/2017 By: Yoshi Rosenbaum MD  
 No Known Allergies Current Immunizations  Reviewed on 4/27/2015 No immunizations on file. Not reviewed this visit You Were Diagnosed With   
  
 Codes Comments Type 2 diabetes mellitus without complication, without long-term current use of insulin (HCC)    -  Primary ICD-10-CM: E11.9 ICD-9-CM: 250.00 Hypothyroidism (acquired)     ICD-10-CM: E03.9 ICD-9-CM: 244.9 Essential hypertension with goal blood pressure less than 140/90     ICD-10-CM: I10 
ICD-9-CM: 401.9 Hyperlipidemia, unspecified hyperlipidemia type     ICD-10-CM: E78.5 ICD-9-CM: 272.4 Vitals BP Pulse Height(growth percentile) Weight(growth percentile) BMI OB Status 116/66 87 5' (1.524 m) 153 lb 12.8 oz (69.8 kg) 30.04 kg/m2 Postmenopausal  
 Smoking Status Never Smoker BMI and BSA Data Body Mass Index Body Surface Area 30.04 kg/m 2 1.72 m 2 Preferred Pharmacy Pharmacy Name Phone [facility] [address] [facility] [phone] Your Updated Medication List  
  
   
This list is accurate as of: 6/30/17 11:25 AM.  Always use your most recent med list. amLODIPine 10 mg tablet Commonly known as:  NORVASC  
daily. aspirin delayed-release 81 mg tablet Take 81 mg by mouth daily. glipiZIDE 10 mg tablet Commonly known as:  Jj Schlein Take 1 Tab by mouth two (2) times a day. One pill with lunch and one pill with dinner  (New directions)  
  
 hydroCHLOROthiazide 12.5 mg tablet Commonly known as:  HYDRODIURIL  
12.5 mg daily. ketoconazole 2 % shampoo Commonly known as:  NIZORAL  
as needed. levothyroxine 50 mcg tablet Commonly known as:  SYNTHROID Take 50 mcg by mouth Daily (before breakfast). metFORMIN  mg tablet Commonly known as:  GLUCOPHAGE XR Take 500 mg by mouth two (2) times a day. omeprazole 20 mg capsule Commonly known as:  PRILOSEC  
  
 rosuvastatin 10 mg tablet Commonly known as:  CRESTOR  
nightly. valsartan 160 mg tablet Commonly known as:  DIOVAN  
160 mg daily. Prescriptions Sent to Pharmacy Refills  
 glipiZIDE (GLUCOTROL) 10 mg tablet 3 Sig: Take 1 Tab by mouth two (2) times a day. One pill with lunch and one pill with dinner  (New directions) Class: Normal  
 Pharmacy: [facility], [address] [place] [address] RD AT [address] [place] [address] Ph #: [phone] Route: Oral  
  
We Performed the Following AMB POC HEMOGLOBIN A1C [14194 CPT(R)] MICROALBUMIN, UR, RAND W/ MICROALBUMIN/CREA RATIO [id number] CPT(R)] [place] COLLECTION VENOUS BLOOD,VENIPUNCTURE [id number] CPT(R)] [place] HANDLG&/OR CONVEY OF SPEC FOR TR OFFICE TO LAB [66090 CPT(R)] T4, FREE [id number] CPT(R)] TSH 3RD GENERATION [49353 CPT(R)] Follow-up Instructions Return in about 3 months (around 9/30/2017). Patient Instructions 1) Increase your Glipzide to 10mg daily.  Take two of the 5mg tablets with lunch and two with dinner. I am going to change your prescription of the Glipizide to a 10mg tablet so when you get your refill the dose will be higher and you will only take one pill of the 10mg tablet with lunch and one pill of the 10mg with dinner. Introducing [facility]! Chriss Colonbatool introduces [employer] patient portal. Now you can access parts of your medical record, email your doctor's office, and request medication refills online. 1. In your internet browser, go to [email or web address] 2. Click on the First Time User? Click Here link in the Sign In box. You will see the New Member Sign Up page. 3. Enter your [employer] Access Code exactly as it appears below. You will not need to use this code after youve completed the sign-up process. If you do not sign up before the expiration date, you must request a new code. · [employer] Access Code: [email or web address] Expires: 9/28/2017 11:25 AM 
 
4. Enter the last four digits of your Social Security Number (xxxx) and Date of Birth (mm/dd/yyyy) as indicated and click Submit. You will be taken to the next sign-up page. 5. Create a [employer] ID. This will be your [employer] login ID and cannot be changed, so think of one that is secure and easy to remember. 6. Create a [employer] password. You can change your password at any time. 7. Enter your Password Reset Question and Answer. This can be used at a later time if you forget your password. 8. Enter your e-mail address. You will receive e-mail notification when new information is available in [address] 9. Click Sign Up. You can now view and download portions of your medical record. 10. Click the Download Summary menu link to download a portable copy of your medical information. If you have questions, please visit the Frequently Asked Questions section of the [employer] website. Remember, [employer] is NOT to be used for urgent needs. For medical emergencies, dial 911. Now available from your iPhone and Android! Please provide this summary of care documentation to your next provider. Your primary care clinician is listed as Davina Fields. If you have any questions after today's visit, please call [phone].

## 2017-07-01 LAB
ALBUMIN/CREAT UR: 4.7 MG/G CREAT (ref 0–30)
CREAT UR-MCNC: 218.3 MG/DL
MICROALBUMIN UR-MCNC: 10.2 UG/ML
T4 FREE SERPL-MCNC: 1.34 NG/DL (ref 0.82–1.77)
TSH SERPL DL<=0.005 MIU/L-ACNC: 3.15 UIU/ML (ref 0.45–4.5)

## 2017-07-05 ENCOUNTER — TELEPHONE (OUTPATIENT)
Dept: ENDOCRINOLOGY | Age: 77
End: 2017-07-05

## 2017-07-05 NOTE — PROGRESS NOTES
Spoke with pt regarding her labs. No evidence of microalbuminuria  Her TFTs were at goal on LT4 50mcg daily.

## 2017-07-05 NOTE — TELEPHONE ENCOUNTER
Patient stated that she missed your call from last week and is now returning the call. She stated that she can be reached at [phone].

## 2017-07-10 RX ORDER — GLIPIZIDE 10 MG/1
10 TABLET ORAL 2 TIMES DAILY
Qty: 180 TAB | Refills: 3 | Status: SHIPPED | OUTPATIENT
Start: 2017-07-10 | End: 2017-11-27 | Stop reason: SDUPTHER

## 2017-11-27 ENCOUNTER — OFFICE VISIT (OUTPATIENT)
Dept: ENDOCRINOLOGY | Age: 77
End: 2017-11-27

## 2017-11-27 VITALS
HEIGHT: 60 IN | HEART RATE: 86 BPM | SYSTOLIC BLOOD PRESSURE: 130 MMHG | DIASTOLIC BLOOD PRESSURE: 89 MMHG | WEIGHT: 152.8 LBS | BODY MASS INDEX: 30 KG/M2

## 2017-11-27 DIAGNOSIS — I10 ESSENTIAL HYPERTENSION WITH GOAL BLOOD PRESSURE LESS THAN 140/90: ICD-10-CM

## 2017-11-27 DIAGNOSIS — E03.9 HYPOTHYROIDISM (ACQUIRED): ICD-10-CM

## 2017-11-27 DIAGNOSIS — E78.5 HYPERLIPIDEMIA, UNSPECIFIED HYPERLIPIDEMIA TYPE: ICD-10-CM

## 2017-11-27 DIAGNOSIS — E11.9 TYPE 2 DIABETES MELLITUS WITHOUT COMPLICATION, WITHOUT LONG-TERM CURRENT USE OF INSULIN (HCC): Primary | ICD-10-CM

## 2017-11-27 LAB — HBA1C MFR BLD HPLC: 9 %

## 2017-11-27 RX ORDER — GLIPIZIDE 10 MG/1
10 TABLET ORAL 2 TIMES DAILY
Qty: 180 TAB | Refills: 3 | Status: SHIPPED | OUTPATIENT
Start: 2017-11-27 | End: 2018-08-14 | Stop reason: SDUPTHER

## 2017-11-27 RX ORDER — METFORMIN HYDROCHLORIDE 500 MG/1
1000 TABLET, EXTENDED RELEASE ORAL 2 TIMES DAILY
Qty: 360 TAB | Refills: 3 | Status: SHIPPED | OUTPATIENT
Start: 2017-11-27 | End: 2019-04-08 | Stop reason: SDUPTHER

## 2017-11-27 NOTE — PATIENT INSTRUCTIONS
1) Continue the Glipizide one pill with lunch and one pill with dinner  2) Metformin. Take two pills with lunch and two pills with dinner. Learning About Diabetes Food Guidelines  Your Care Instructions    Meal planning is important to manage diabetes. It helps keep your blood sugar at a target level (which you set with your doctor). You don't have to eat special foods. You can eat what your family eats, including sweets once in a while. But you do have to pay attention to how often you eat and how much you eat of certain foods. You may want to work with a dietitian or a certified diabetes educator (CDE) to help you plan meals and snacks. A dietitian or CDE can also help you lose weight if that is one of your goals. What should you know about eating carbs? Managing the amount of carbohydrate (carbs) you eat is an important part of healthy meals when you have diabetes. Carbohydrate is found in many foods. · Learn which foods have carbs. And learn the amounts of carbs in different foods. ¨ Bread, cereal, pasta, and rice have about 15 grams of carbs in a serving. A serving is 1 slice of bread (1 ounce), ½ cup of cooked cereal, or 1/3 cup of cooked pasta or rice. ¨ Fruits have 15 grams of carbs in a serving. A serving is 1 small fresh fruit, such as an apple or orange; ½ of a banana; ½ cup of cooked or canned fruit; ½ cup of fruit juice; 1 cup of melon or raspberries; or 2 tablespoons of dried fruit. ¨ Milk and no-sugar-added yogurt have 15 grams of carbs in a serving. A serving is 1 cup of milk or 2/3 cup of no-sugar-added yogurt. ¨ Starchy vegetables have 15 grams of carbs in a serving. A serving is ½ cup of mashed potatoes or sweet potato; 1 cup winter squash; ½ of a small baked potato; ½ cup of cooked beans; or ½ cup cooked corn or green peas. · Learn how much carbs to eat each day and at each meal. A dietitian or CDE can teach you how to keep track of the amount of carbs you eat.  This is called carbohydrate counting. · If you are not sure how to count carbohydrate grams, use the Plate Method to plan meals. It is a good, quick way to make sure that you have a balanced meal. It also helps you spread carbs throughout the day. ¨ Divide your plate by types of foods. Put non-starchy vegetables on half the plate, meat or other protein food on one-quarter of the plate, and a grain or starchy vegetable in the final quarter of the plate. To this you can add a small piece of fruit and 1 cup of milk or yogurt, depending on how many carbs you are supposed to eat at a meal.  · Try to eat about the same amount of carbs at each meal. Do not \"save up\" your daily allowance of carbs to eat at one meal.  · Proteins have very little or no carbs per serving. Examples of proteins are beef, chicken, turkey, fish, eggs, tofu, cheese, cottage cheese, and peanut butter. A serving size of meat is 3 ounces, which is about the size of a deck of cards. Examples of meat substitute serving sizes (equal to 1 ounce of meat) are 1/4 cup of cottage cheese, 1 egg, 1 tablespoon of peanut butter, and ½ cup of tofu. How can you eat out and still eat healthy? · Learn to estimate the serving sizes of foods that have carbohydrate. If you measure food at home, it will be easier to estimate the amount in a serving of restaurant food. · If the meal you order has too much carbohydrate (such as potatoes, corn, or baked beans), ask to have a low-carbohydrate food instead. Ask for a salad or green vegetables. · If you use insulin, check your blood sugar before and after eating out to help you plan how much to eat in the future. · If you eat more carbohydrate at a meal than you had planned, take a walk or do other exercise. This will help lower your blood sugar. What else should you know? · Limit saturated fat, such as the fat from meat and dairy products.  This is a healthy choice because people who have diabetes are at higher risk of heart disease. So choose lean cuts of meat and nonfat or low-fat dairy products. Use olive or canola oil instead of butter or shortening when cooking. · Don't skip meals. Your blood sugar may drop too low if you skip meals and take insulin or certain medicines for diabetes. · Check with your doctor before you drink alcohol. Alcohol can cause your blood sugar to drop too low. Alcohol can also cause a bad reaction if you take certain diabetes medicines. Follow-up care is a key part of your treatment and safety. Be sure to make and go to all appointments, and call your doctor if you are having problems. It's also a good idea to know your test results and keep a list of the medicines you take. Where can you learn more? Go to http://kim-monique.info/. Enter G153 in the search box to learn more about \"Learning About Diabetes Food Guidelines. \"  Current as of: March 13, 2017  Content Version: 11.4  © 3304-6381 [employer]. Care instructions adapted under license by [employer] (which disclaims liability or warranty for this information). If you have questions about a medical condition or this instruction, always ask your healthcare professional. [address] any warranty or liability for your use of this information.

## 2017-11-27 NOTE — PROGRESS NOTES
Chief Complaint   Patient presents with    Diabetes     pcp and pharmacy verified. Eye exam UTD    Thyroid Problem   Records since last visit reviewed  History of Present Illness: Antonio Javed is a 78 y.o. female here for follow up of diabetes and hypothyroidism. She was diagnosed with diabetes around 2012. At our last visit in June 2017 her A1C was 8.8% on Metformin 1000mg BID and Glipizide 7.5mg daily. I instructed her to increase her Glipizide to 10mg BID and continue the Metformin 1000mg BID. Her A1C today was 9.0%. Her weight is stable since our last visit at 152 pounds. Pt notes she has been feeling well. She denies issues of illness, injury or hospitalization. Pt is taking the Glipizide 10mg BID and Metformin 500mg BID. She checks her BGs about once per week, mostly at night. She notes it will run in the 150-170's  She denies issues of hypoglycemia. She notes that over the summer she was eating a lot of fruits. She also notes that she has stopped the walking routine. Pt is eating 2 meals per day. She will have \"bruch\" around 130-2PM, Yesterday she had leftovers from [employer] and iced tea (equal)  She will occasionally have a piece of fruit, nabs or yogurt in the afternoon  She has dinner around 7PM, last night she had leftovers from [employer]. Pt goes to bed 11PM.  Pt notes she wakes up every morning around 3AM and has to go to the restroom. She has joined a gym and she plans to restart her waking and exercise regimen. No history of vascular disease. No history of neuropathy, or nephropathy. Last eye exam was April 2017, no retinopathy, will request these records. Pt has hx of cataracts removed in 2012. Pt was diagnosed with hypothyroidism in May 2015 by her PCP. She was on LT4 50mcg in June 2017 and her TFTs were at goal.  She denies issues of palpitations, tremors, CP, SOB, heat or cold intolerance, diarrhea or constipation.     Pt had a colonoscopy in May 2016. She was found to have diverticulosis but no concerning findings. Current Outpatient Prescriptions   Medication Sig    glipiZIDE (GLUCOTROL) 10 mg tablet Take 1 Tab by mouth two (2) times a day.  metFORMIN ER (GLUCOPHAGE XR) 500 mg tablet Take 2 Tabs by mouth two (2) times a day.  rosuvastatin (CRESTOR) 10 mg tablet nightly.  omeprazole (PRILOSEC) 20 mg capsule     amLODIPine (NORVASC) 10 mg tablet daily.  hydrochlorothiazide (HYDRODIURIL) 12.5 mg tablet 12.5 mg daily.  valsartan (DIOVAN) 160 mg tablet 160 mg daily.  levothyroxine (SYNTHROID) 50 mcg tablet Take 50 mcg by mouth Daily (before breakfast).  ketoconazole (NIZORAL) 2 % shampoo as needed.  aspirin delayed-release 81 mg tablet Take 81 mg by mouth daily. No current facility-administered medications for this visit. No Known Allergies  Review of Systems:  - Eyes: no blurry vision or double vision  - Cardiovascular: no chest pain  - Respiratory: no shortness of breath  - Musculoskeletal: no myalgias  - Neurological: no numbness/tingling in extremities    Physical Examination:  Blood pressure 130/89, pulse 86, height 5' (1.524 m), weight 152 lb 12.8 oz (69.3 kg). - General: pleasant, no distress, good eye contact   - Neck: no carotid bruits  - Cardiovascular: regular, normal rate, nl s1 and s2, no m/r/g, 2+ DP pulses   - Respiratory: clear bilaterally  - Integumentary: no edema, no foot ulcers, sensation to monofilament and vibration intact bilaterally  - Psychiatric: normal mood and affect    Data Reviewed:   Her A1C today was 9.0%. Assessment/Plan:   1) DM > Her A1C today was 9.0%. Pt is not having any hypoglycemia. Our goal A1C is 8.0% or less. Pt given copy of \"Daily Diabetes Meal Planning Guide\" and educated about the \"[place] dinner plate\", reading food labels and which foods have the highest carbohydrates.   Pt instructed to limit her carbohydrates to 45-60 grams with meals and 15 grams or less with snacks (but to limit snacks). Will have pt continue the Glipizide 10mg BID and increase the Metformin XR to 1000mg BID. Pt to check her BG twice daily and mail her BG logs to me in 3 weeks. 2) Hypothyroidism > Pt is clinically euthyroid on LT4 50mcg daily. Her TFTs were at goal in June 2017. 3) HTN > BP at goal on her current regimen. 4) HLD > Pt is on Rosuvastatin 10mg daily which she is tolerating well. Her LDL in March 2017 was at goal.    Pt voices understanding and agreement with the plan. RTC 3 months    Patient Instructions   1) Continue the Glipizide one pill with lunch and one pill with dinner  2) Metformin. Take two pills with lunch and two pills with dinner. Learning About Diabetes Food Guidelines  Your Care Instructions    Meal planning is important to manage diabetes. It helps keep your blood sugar at a target level (which you set with your doctor). You don't have to eat special foods. You can eat what your family eats, including sweets once in a while. But you do have to pay attention to how often you eat and how much you eat of certain foods. You may want to work with a dietitian or a certified diabetes educator (CDE) to help you plan meals and snacks. A dietitian or CDE can also help you lose weight if that is one of your goals. What should you know about eating carbs? Managing the amount of carbohydrate (carbs) you eat is an important part of healthy meals when you have diabetes. Carbohydrate is found in many foods. · Learn which foods have carbs. And learn the amounts of carbs in different foods. ¨ Bread, cereal, pasta, and rice have about 15 grams of carbs in a serving. A serving is 1 slice of bread (1 ounce), ½ cup of cooked cereal, or 1/3 cup of cooked pasta or rice. ¨ Fruits have 15 grams of carbs in a serving.  A serving is 1 small fresh fruit, such as an apple or orange; ½ of a banana; ½ cup of cooked or canned fruit; ½ cup of fruit juice; 1 cup of melon or raspberries; or 2 tablespoons of dried fruit. ¨ Milk and no-sugar-added yogurt have 15 grams of carbs in a serving. A serving is 1 cup of milk or 2/3 cup of no-sugar-added yogurt. ¨ Starchy vegetables have 15 grams of carbs in a serving. A serving is ½ cup of mashed potatoes or sweet potato; 1 cup winter squash; ½ of a small baked potato; ½ cup of cooked beans; or ½ cup cooked corn or green peas. · Learn how much carbs to eat each day and at each meal. A dietitian or CDE can teach you how to keep track of the amount of carbs you eat. This is called carbohydrate counting. · If you are not sure how to count carbohydrate grams, use the Plate Method to plan meals. It is a good, quick way to make sure that you have a balanced meal. It also helps you spread carbs throughout the day. ¨ Divide your plate by types of foods. Put non-starchy vegetables on half the plate, meat or other protein food on one-quarter of the plate, and a grain or starchy vegetable in the final quarter of the plate. To this you can add a small piece of fruit and 1 cup of milk or yogurt, depending on how many carbs you are supposed to eat at a meal.  · Try to eat about the same amount of carbs at each meal. Do not \"save up\" your daily allowance of carbs to eat at one meal.  · Proteins have very little or no carbs per serving. Examples of proteins are beef, chicken, turkey, fish, eggs, tofu, cheese, cottage cheese, and peanut butter. A serving size of meat is 3 ounces, which is about the size of a deck of cards. Examples of meat substitute serving sizes (equal to 1 ounce of meat) are 1/4 cup of cottage cheese, 1 egg, 1 tablespoon of peanut butter, and ½ cup of tofu. How can you eat out and still eat healthy? · Learn to estimate the serving sizes of foods that have carbohydrate. If you measure food at home, it will be easier to estimate the amount in a serving of restaurant food.   · If the meal you order has too much carbohydrate (such as potatoes, corn, or baked beans), ask to have a low-carbohydrate food instead. Ask for a salad or green vegetables. · If you use insulin, check your blood sugar before and after eating out to help you plan how much to eat in the future. · If you eat more carbohydrate at a meal than you had planned, take a walk or do other exercise. This will help lower your blood sugar. What else should you know? · Limit saturated fat, such as the fat from meat and dairy products. This is a healthy choice because people who have diabetes are at higher risk of heart disease. So choose lean cuts of meat and nonfat or low-fat dairy products. Use olive or canola oil instead of butter or shortening when cooking. · Don't skip meals. Your blood sugar may drop too low if you skip meals and take insulin or certain medicines for diabetes. · Check with your doctor before you drink alcohol. Alcohol can cause your blood sugar to drop too low. Alcohol can also cause a bad reaction if you take certain diabetes medicines. Follow-up care is a key part of your treatment and safety. Be sure to make and go to all appointments, and call your doctor if you are having problems. It's also a good idea to know your test results and keep a list of the medicines you take. Where can you learn more? Go to http://kim-monique.info/. Enter I994 in the search box to learn more about \"Learning About Diabetes Food Guidelines. \"  Current as of: March 13, 2017  Content Version: 11.4  © 9929-7579 [employer]. Care instructions adapted under license by [employer] (which disclaims liability or warranty for this information). If you have questions about a medical condition or this instruction, always ask your healthcare professional. [place] [address] any warranty or liability for your use of this information. Follow-up Disposition:  Return in about 3 months (around 2/27/2018).     Copy sent to:  Dr. Paramjit Ruff

## 2017-11-27 NOTE — MR AVS SNAPSHOT
Visit Information Date & Time Provider Department Dept. Phone Encounter #  
 11/27/2017  3:50 PM Antonio Shelton, [address] [place] [address] Diabetes and Endocrinology [phone] [id number] Upcoming Health Maintenance Date Due DTaP/Tdap/Td series (1 - Tdap) 4/28/1963 ZOSTER VACCINE AGE 60> 2/28/2002 OSTEOPOROSIS SCREENING (DEXA) 4/28/2007 Pneumococcal 65+ Low/Medium Risk (1 of 2 - PCV13) 4/28/2007 MEDICARE YEARLY EXAM 4/28/2007 FOOT EXAM Q1 5/12/2017 Influenza Age 5 to Adult 8/1/2017 HEMOGLOBIN A1C Q6M 12/30/2017 LIPID PANEL Q1 3/2/2018 EYE EXAM RETINAL OR DILATED Q1 4/3/2018 MICROALBUMIN Q1 6/30/2018 GLAUCOMA SCREENING Q2Y 4/3/2019 Allergies as of 11/27/2017  Review Complete On: 11/27/2017 By: Antonio Shelton MD  
 No Known Allergies Current Immunizations  Reviewed on 4/27/2015 No immunizations on file. Not reviewed this visit Vitals BP Pulse Height(growth percentile) Weight(growth percentile) BMI OB Status 130/89 86 5' (1.524 m) 152 lb 12.8 oz (69.3 kg) 29.84 kg/m2 Postmenopausal  
 Smoking Status Never Smoker Vitals History BMI and BSA Data Body Mass Index Body Surface Area  
 29.84 kg/m 2 1.71 m 2 Preferred Pharmacy Pharmacy Name Phone [address] [phone] Your Updated Medication List  
  
   
This list is accurate as of: 11/27/17  4:09 PM.  Always use your most recent med list. amLODIPine 10 mg tablet Commonly known as:  NORVASC  
daily. aspirin delayed-release 81 mg tablet Take 81 mg by mouth daily. glipiZIDE 10 mg tablet Commonly known as:  Dolph Siad Take 1 Tab by mouth two (2) times a day. One pill with lunch and one pill with dinner  (New directions)  
  
 hydroCHLOROthiazide 12.5 mg tablet Commonly known as:  HYDRODIURIL  
12.5 mg daily. ketoconazole 2 % shampoo Commonly known as:  NIZORAL  
as needed. levothyroxine 50 mcg tablet Commonly known as:  SYNTHROID Take 50 mcg by mouth Daily (before breakfast). metFORMIN  mg tablet Commonly known as:  GLUCOPHAGE XR Take 500 mg by mouth two (2) times a day. omeprazole 20 mg capsule Commonly known as:  PRILOSEC  
  
 rosuvastatin 10 mg tablet Commonly known as:  CRESTOR  
nightly. valsartan 160 mg tablet Commonly known as:  DIOVAN  
160 mg daily. Patient Instructions 1) Continue the Glipizide one pill with lunch and one pill with dinner 2) Metformin. Take two pills with lunch and two pills with dinner. Learning About Diabetes Food Guidelines Your Care Instructions Meal planning is important to manage diabetes. It helps keep your blood sugar at a target level (which you set with your doctor). You don't have to eat special foods. You can eat what your family eats, including sweets once in a while. But you do have to pay attention to how often you eat and how much you eat of certain foods. You may want to work with a dietitian or a certified diabetes educator (CDE) to help you plan meals and snacks. A dietitian or CDE can also help you lose weight if that is one of your goals. What should you know about eating carbs? Managing the amount of carbohydrate (carbs) you eat is an important part of healthy meals when you have diabetes. Carbohydrate is found in many foods. · Learn which foods have carbs. And learn the amounts of carbs in different foods. ¨ Bread, cereal, pasta, and rice have about 15 grams of carbs in a serving. A serving is 1 slice of bread (1 ounce), ½ cup of cooked cereal, or 1/3 cup of cooked pasta or rice. ¨ Fruits have 15 grams of carbs in a serving.  A serving is 1 small fresh fruit, such as an apple or orange; ½ of a banana; ½ cup of cooked or canned fruit; ½ cup of fruit juice; 1 cup of melon or raspberries; or 2 tablespoons of dried fruit. ¨ Milk and no-sugar-added yogurt have 15 grams of carbs in a serving. A serving is 1 cup of milk or 2/3 cup of no-sugar-added yogurt. ¨ Starchy vegetables have 15 grams of carbs in a serving. A serving is ½ cup of mashed potatoes or sweet potato; 1 cup winter squash; ½ of a small baked potato; ½ cup of cooked beans; or ½ cup cooked corn or green peas. · Learn how much carbs to eat each day and at each meal. A dietitian or CDE can teach you how to keep track of the amount of carbs you eat. This is called carbohydrate counting. · If you are not sure how to count carbohydrate grams, use the Plate Method to plan meals. It is a good, quick way to make sure that you have a balanced meal. It also helps you spread carbs throughout the day. ¨ Divide your plate by types of foods. Put non-starchy vegetables on half the plate, meat or other protein food on one-quarter of the plate, and a grain or starchy vegetable in the final quarter of the plate. To this you can add a small piece of fruit and 1 cup of milk or yogurt, depending on how many carbs you are supposed to eat at a meal. 
· Try to eat about the same amount of carbs at each meal. Do not \"save up\" your daily allowance of carbs to eat at one meal. 
· Proteins have very little or no carbs per serving. Examples of proteins are beef, chicken, turkey, fish, eggs, tofu, cheese, cottage cheese, and peanut butter. A serving size of meat is 3 ounces, which is about the size of a deck of cards. Examples of meat substitute serving sizes (equal to 1 ounce of meat) are 1/4 cup of cottage cheese, 1 egg, 1 tablespoon of peanut butter, and ½ cup of tofu. How can you eat out and still eat healthy? · Learn to estimate the serving sizes of foods that have carbohydrate. If you measure food at home, it will be easier to estimate the amount in a serving of restaurant food.  
· If the meal you order has too much carbohydrate (such as potatoes, corn, or baked beans), ask to have a low-carbohydrate food instead. Ask for a salad or green vegetables. · If you use insulin, check your blood sugar before and after eating out to help you plan how much to eat in the future. · If you eat more carbohydrate at a meal than you had planned, take a walk or do other exercise. This will help lower your blood sugar. What else should you know? · Limit saturated fat, such as the fat from meat and dairy products. This is a healthy choice because people who have diabetes are at higher risk of heart disease. So choose lean cuts of meat and nonfat or low-fat dairy products. Use olive or canola oil instead of butter or shortening when cooking. · Don't skip meals. Your blood sugar may drop too low if you skip meals and take insulin or certain medicines for diabetes. · Check with your doctor before you drink alcohol. Alcohol can cause your blood sugar to drop too low. Alcohol can also cause a bad reaction if you take certain diabetes medicines. Follow-up care is a key part of your treatment and safety. Be sure to make and go to all appointments, and call your doctor if you are having problems. It's also a good idea to know your test results and keep a list of the medicines you take. Where can you learn more? Go to http://kim-monique.info/. Enter Q991 in the search box to learn more about \"Learning About Diabetes Food Guidelines. \" Current as of: March 13, 2017 Content Version: 11.4 © 1419-4518 [employer]. Care instructions adapted under license by [employer] (which disclaims liability or warranty for this information). If you have questions about a medical condition or this instruction, always ask your healthcare professional. [address] any warranty or liability for your use of this information. Introducing [facility]!    
 [employer] introduces [employer] patient portal. Now you can access parts of your medical record, email your doctor's office, and request medication refills online. 1. In your internet browser, go to [email or web address] 2. Click on the First Time User? Click Here link in the Sign In box. You will see the New Member Sign Up page. 3. Enter your Pacinian Access Code exactly as it appears below. You will not need to use this code after youve completed the sign-up process. If you do not sign up before the expiration date, you must request a new code. · Pacinian Access Code: [email or web address] Expires: 2/25/2018  4:09 PM 
 
4. Enter the last four digits of your Social Security Number (xxxx) and Date of Birth (mm/dd/yyyy) as indicated and click Submit. You will be taken to the next sign-up page. 5. Create a Pacinian ID. This will be your Pacinian login ID and cannot be changed, so think of one that is secure and easy to remember. 6. Create a Pacinian password. You can change your password at any time. 7. Enter your Password Reset Question and Answer. This can be used at a later time if you forget your password. 8. Enter your e-mail address. You will receive e-mail notification when new information is available in [address] 9. Click Sign Up. You can now view and download portions of your medical record. 10. Click the Download Summary menu link to download a portable copy of your medical information. If you have questions, please visit the Frequently Asked Questions section of the Pacinian website. Remember, Pacinian is NOT to be used for urgent needs. For medical emergencies, dial 911. Now available from your iPhone and Android! Please provide this summary of care documentation to your next provider. Your primary care clinician is listed as Sheela Bassett. If you have any questions after today's visit, please call [phone].

## 2018-01-14 ENCOUNTER — TELEPHONE (OUTPATIENT)
Dept: ENDOCRINOLOGY | Age: 78
End: 2018-01-14

## 2018-01-14 NOTE — TELEPHONE ENCOUNTER
Pt called noting that she has had the flu for the last week and her BGs have been >200. Yesterday her BG was in the 400s so she went to [facility] clinic. She was given fluids and insulin. She notes that by the time she was sent home her BG was in the 300's. She notes that she was concerned about taking insulin with her Glipizide and metformin so she did not take her Glipizide or Metformin last night or this morning. She also notes she was concerned about taking the insulin so she has not taken any of that either. This AM her BG was in the 300's. She is going back to the  clinic this moring. I instructed her to take her Metformin and Glipizide 20mg now and that it was safe to take the Glipizide and Metformin with insulin. Will call her tomorrow to discuss what she was instructed to do by the  doctor how how her BGs are tomorrow.

## 2018-01-15 NOTE — TELEPHONE ENCOUNTER
Spoke with pt this afternoon. She notes that yesterday morning her BG was 353, she went to the [facility] clinic and they gave her her insulin, which was Lantus 20 units. Her BG this AM was 297. I instructed her to increase the Lantus to 30 units every day and to continue her Glipizide 10mg BID and Metformin XR 500mg BID. Pt to keep a log of her BG and call the office in a week with her readings. If she has a BG in the 70 or less is to call and tells us sooner. Pt voices understanding and agreement with the plan.

## 2018-01-21 ENCOUNTER — HOSPITAL ENCOUNTER (EMERGENCY)
Age: 78
Discharge: HOME | End: 2018-01-21
Attending: EMERGENCY MEDICINE
Payer: MEDICARE

## 2018-01-21 ENCOUNTER — TELEPHONE (OUTPATIENT)
Dept: ENDOCRINOLOGY | Age: 78
End: 2018-01-21

## 2018-01-21 VITALS
DIASTOLIC BLOOD PRESSURE: 79 MMHG | WEIGHT: 158.07 LBS | HEIGHT: 60 IN | OXYGEN SATURATION: 98 % | HEART RATE: 93 BPM | SYSTOLIC BLOOD PRESSURE: 112 MMHG | BODY MASS INDEX: 31.03 KG/M2 | RESPIRATION RATE: 18 BRPM

## 2018-01-21 DIAGNOSIS — N30.00 ACUTE CYSTITIS WITHOUT HEMATURIA: Primary | ICD-10-CM

## 2018-01-21 DIAGNOSIS — B37.41 YEAST CYSTITIS: ICD-10-CM

## 2018-01-21 LAB
APPEARANCE UR: ABNORMAL
BACTERIA URNS QL MICRO: ABNORMAL /HPF
BILIRUB UR QL: NEGATIVE
COLOR UR: ABNORMAL
EPITH CASTS URNS QL MICRO: ABNORMAL /LPF
GLUCOSE BLD STRIP.AUTO-MCNC: 99 MG/DL (ref 65–100)
GLUCOSE UR STRIP.AUTO-MCNC: NEGATIVE MG/DL
HGB UR QL STRIP: ABNORMAL
HYALINE CASTS URNS QL MICRO: ABNORMAL /LPF (ref 0–5)
KETONES UR QL STRIP.AUTO: ABNORMAL MG/DL
LEUKOCYTE ESTERASE UR QL STRIP.AUTO: ABNORMAL
NITRITE UR QL STRIP.AUTO: NEGATIVE
OTHER,OTHU: ABNORMAL
PH UR STRIP: 5 [PH] (ref 5–8)
PROT UR STRIP-MCNC: NEGATIVE MG/DL
RBC #/AREA URNS HPF: ABNORMAL /HPF (ref 0–5)
SERVICE CMNT-IMP: NORMAL
SP GR UR REFRACTOMETRY: 1.02 (ref 1–1.03)
UA: UC IF INDICATED,UAUC: ABNORMAL
UROBILINOGEN UR QL STRIP.AUTO: 1 EU/DL (ref 0.2–1)
WBC URNS QL MICRO: ABNORMAL /HPF (ref 0–4)

## 2018-01-21 PROCEDURE — 87186 SC STD MICRODIL/AGAR DIL: CPT | Performed by: EMERGENCY MEDICINE

## 2018-01-21 PROCEDURE — 99284 EMERGENCY DEPT VISIT MOD MDM: CPT

## 2018-01-21 PROCEDURE — 82962 GLUCOSE BLOOD TEST: CPT

## 2018-01-21 PROCEDURE — 87086 URINE CULTURE/COLONY COUNT: CPT | Performed by: EMERGENCY MEDICINE

## 2018-01-21 PROCEDURE — 87077 CULTURE AEROBIC IDENTIFY: CPT | Performed by: EMERGENCY MEDICINE

## 2018-01-21 PROCEDURE — 81001 URINALYSIS AUTO W/SCOPE: CPT | Performed by: EMERGENCY MEDICINE

## 2018-01-21 RX ORDER — FLUCONAZOLE 100 MG/1
150 TABLET ORAL ONCE
Qty: 1 TAB | Refills: 1 | Status: SHIPPED | OUTPATIENT
Start: 2018-01-21 | End: 2018-01-21

## 2018-01-21 RX ORDER — FLUCONAZOLE 100 MG/1
100 TABLET ORAL DAILY
Qty: 2 TAB | Refills: 0 | Status: SHIPPED | OUTPATIENT
Start: 2018-01-21 | End: 2018-01-23

## 2018-01-21 RX ORDER — FLUCONAZOLE 100 MG/1
100 TABLET ORAL DAILY
Qty: 2 TAB | Refills: 0 | Status: SHIPPED | OUTPATIENT
Start: 2018-01-21 | End: 2018-01-28

## 2018-01-21 RX ORDER — INSULIN GLARGINE 100 [IU]/ML
20 INJECTION, SOLUTION SUBCUTANEOUS DAILY
COMMUNITY
End: 2018-11-01

## 2018-01-21 RX ORDER — FLUCONAZOLE 150 MG/1
150 TABLET ORAL
Qty: 1 TAB | Refills: 0 | Status: SHIPPED | OUTPATIENT
Start: 2018-01-22 | End: 2018-01-22

## 2018-01-21 RX ORDER — CEPHALEXIN 500 MG/1
500 CAPSULE ORAL 4 TIMES DAILY
Qty: 28 CAP | Refills: 0 | Status: SHIPPED | OUTPATIENT
Start: 2018-01-21 | End: 2018-01-28

## 2018-01-21 NOTE — TELEPHONE ENCOUNTER
Mrs Varma Dress paged me due to concerns about her glucoses and due to feeling poorly    Glucose was 137 this AM and 98 this evening. Glucoses were in 400s last week, but have been in 120s last few days    She has been feeling dizzy and 'shaky' today. She does not have the ability to check her blood pressure at home. She feels she needs to go to the ER to be 'checked out'. She wanted to know what I thought about this. I deferred this decision to her and to her judgement. She decided she will go to ER.

## 2018-01-22 ENCOUNTER — TELEPHONE (OUTPATIENT)
Dept: ENDOCRINOLOGY | Age: 78
End: 2018-01-22

## 2018-01-22 NOTE — TELEPHONE ENCOUNTER
Patient is requesting a call back in regards to her insulin. She stated that she can be reached at [phone].

## 2018-01-22 NOTE — DISCHARGE INSTRUCTIONS

## 2018-01-22 NOTE — TELEPHONE ENCOUNTER
Spoke with patient. She states that she has a UTI and yeast infection. Her \"BSs have been up all week\". Last night her BS went to 98. She felt shaky, fainty and nervous. She spoke with  last night. She decided to go to ER. This am BS was 222 fasting. She ate eggs and macario. Her BS came down to 192. She states that she feels much better today. She is taking Glipizide BID and Metformin 500 mg BID. (she said that she cannot tolerate taking 2 Metformin BID). Any suggestions/recommendations?

## 2018-01-22 NOTE — ED PROVIDER NOTES
EMERGENCY DEPARTMENT HISTORY AND PHYSICAL EXAM      Date: 1/21/2018  Patient Name: Abhilash Rodriguez    History of Presenting Illness     Chief Complaint   Patient presents with    Blood sugar problem     Pt ambulatory to triage with c/o unstable blood sugar for 3 days. Pt states her blood sugar fluctuates between too high and too low. Reports dizziness and nausea. Reports frequent urination. History Provided By: Patient    HPI: Abhilash Rodriguez, 78 y.o. female with PMHx significant for HTN, DM controlled with insulin and medication, presents ambulatory to the ED with cc of persistent mild to moderate dizziness, generalized weakness, and diaphoresis x 1 day secondary to unstable blood glucose. Pt states she was dx with influenza last week and her glucose remained high (~490) throughout the week. She explains today her bg was in the 90's. Pt reports compliance with DM medications and insulin. She specifically denies any fevers, chills, nausea, vomiting, chest pain, shortness of breath, headache, rash, diarrhea, or weight loss. PCP: Ramona Horowitz MD    There are no other complaints, changes, or physical findings at this time. Current Outpatient Prescriptions   Medication Sig Dispense Refill    insulin glargine (LANTUS SOLOSTAR) 100 unit/mL (3 mL) inpn 20 Units by SubCUTAneous route daily.  cephALEXin (KEFLEX) 500 mg capsule Take 1 Cap by mouth four (4) times daily for 7 days. 28 Cap 0    fluconazole (DIFLUCAN) 100 mg tablet Take 1.5 Tabs by mouth once for 1 dose. 1 Tab 1    fluconazole (DIFLUCAN) 100 mg tablet Take 1 Tab by mouth daily for 2 days. FDA advises cautious prescribing of oral fluconazole in pregnancy. 2 Tab 0    cephALEXin (KEFLEX) 500 mg capsule Take 1 Cap by mouth four (4) times daily for 7 days. 28 Cap 0    fluconazole (DIFLUCAN) 100 mg tablet Take 1 Tab by mouth daily for 7 days. FDA advises cautious prescribing of oral fluconazole in pregnancy.  2 Tab 0    glipiZIDE (GLUCOTROL) 10 mg tablet Take 1 Tab by mouth two (2) times a day. 180 Tab 3    metFORMIN ER (GLUCOPHAGE XR) 500 mg tablet Take 2 Tabs by mouth two (2) times a day. 360 Tab 3    rosuvastatin (CRESTOR) 10 mg tablet nightly.  omeprazole (PRILOSEC) 20 mg capsule       amLODIPine (NORVASC) 10 mg tablet daily.  hydrochlorothiazide (HYDRODIURIL) 12.5 mg tablet 12.5 mg daily.  valsartan (DIOVAN) 160 mg tablet 160 mg daily.  levothyroxine (SYNTHROID) 50 mcg tablet Take 50 mcg by mouth Daily (before breakfast).  ketoconazole (NIZORAL) 2 % shampoo as needed. 0    aspirin delayed-release 81 mg tablet Take 81 mg by mouth daily. Past History     Past Medical History:  Past Medical History:   Diagnosis Date    Diabetes ([facility] [address]     Family history of colon cancer in mother 2016    Mother, over age [de-identified]    Gastrointestinal disorder     GERD    Hypertension     Other ill-defined conditions(799.89)     High Cholesterol    Screen for colon cancer 2016       Past Surgical History:  Past Surgical History:   Procedure Laterality Date    HX GI  2003    hiatal hernia       Family History:  Family History   Problem Relation Age of Onset    Diabetes Mother     Liver Disease Mother     Hypertension Father     Lung Disease Father      COPD    Heart Disease Sister       of MI    Diabetes Brother     Kidney Disease Brother     Hypertension Brother     Diabetes Sister        Social History:  Social History   Substance Use Topics    Smoking status: Never Smoker    Smokeless tobacco: Never Used    Alcohol use No       Allergies:  No Known Allergies      Review of Systems   Review of Systems   Constitutional: Positive for diaphoresis. Negative for activity change, appetite change, chills, fatigue, fever and unexpected weight change. HENT: Negative. Negative for congestion, hearing loss, rhinorrhea, sneezing and voice change. Eyes: Negative.   Negative for pain and visual disturbance. Respiratory: Negative. Negative for apnea, cough, choking, chest tightness and shortness of breath. Cardiovascular: Negative. Negative for chest pain and palpitations. Gastrointestinal: Negative. Negative for abdominal distention, abdominal pain, blood in stool, diarrhea, nausea and vomiting. Genitourinary: Negative. Negative for difficulty urinating, flank pain, frequency and urgency. No discharge   Musculoskeletal: Negative. Negative for arthralgias, back pain, myalgias and neck stiffness. Skin: Negative. Negative for color change and rash. Neurological: Positive for dizziness and weakness (generalized). Negative for seizures, syncope, speech difficulty, numbness and headaches. Hematological: Negative for adenopathy. Psychiatric/Behavioral: Negative. Negative for agitation, behavioral problems, dysphoric mood and suicidal ideas. The patient is not nervous/anxious. Physical Exam   Physical Exam   Constitutional: She is oriented to person, place, and time. She appears well-developed and well-nourished. No distress. HENT:   Head: Normocephalic and atraumatic. Mouth/Throat: Oropharynx is clear and moist. No oropharyngeal exudate. Eyes: Conjunctivae and EOM are normal. Pupils are equal, round, and reactive to light. Right eye exhibits no discharge. Left eye exhibits no discharge. Neck: Normal range of motion. Neck supple. Cardiovascular: Normal rate, regular rhythm and intact distal pulses. Exam reveals no gallop and no friction rub. No murmur heard. Pulmonary/Chest: Effort normal and breath sounds normal. No respiratory distress. She has no wheezes. She has no rales. She exhibits no tenderness. Abdominal: Soft. Bowel sounds are normal. She exhibits no distension and no mass. There is no tenderness. There is no rebound and no guarding. Musculoskeletal: Normal range of motion. She exhibits no edema.    Lymphadenopathy:     She has no cervical adenopathy. Neurological: She is alert and oriented to person, place, and time. No cranial nerve deficit. Coordination normal.   Skin: Skin is warm and dry. No rash noted. No erythema. Psychiatric: She has a normal mood and affect. Nursing note and vitals reviewed. Diagnostic Study Results     Labs -     Recent Results (from the past 12 hour(s))   GLUCOSE, POC    Collection Time: 01/21/18  7:31 PM   Result Value Ref Range    Glucose (POC) 99 65 - 100 mg/dL    Performed by Rashad Taveras    Collection Time: 01/21/18  8:20 PM   Result Value Ref Range    Color YELLOW/STRAW      Appearance CLOUDY (A) CLEAR      Specific gravity 1.024 1.003 - 1.030      pH (UA) 5.0 5.0 - 8.0      Protein NEGATIVE  NEG mg/dL    Glucose NEGATIVE  NEG mg/dL    Ketone TRACE (A) NEG mg/dL    Bilirubin NEGATIVE  NEG      Blood SMALL (A) NEG      Urobilinogen 1.0 0.2 - 1.0 EU/dL    Nitrites NEGATIVE  NEG      Leukocyte Esterase LARGE (A) NEG      WBC 5-10 0 - 4 /hpf    RBC 5-10 0 - 5 /hpf    Epithelial cells MANY (A) FEW /lpf    Bacteria 1+ (A) NEG /hpf    UA:UC IF INDICATED URINE CULTURE ORDERED (A) CNI      Hyaline cast 2-5 0 - 5 /lpf    Other: Renal Epithelial cells Present           Medical Decision Making   I am the first provider for this patient. I reviewed the vital signs, available nursing notes, past medical history, past surgical history, family history and social history. Vital Signs-Reviewed the patient's vital signs.   Patient Vitals for the past 12 hrs:   Pulse Resp BP SpO2   01/21/18 2130 - - 112/79 98 %   01/21/18 2105 - - - 98 %   01/21/18 2104 - - 113/79 -   01/21/18 2011 - - - 97 %   01/21/18 2000 - - 121/82 98 %   01/21/18 1955 - - - 98 %   01/21/18 1954 - - 120/82 -   01/21/18 1931 93 18 145/89 100 %       Pulse Oximetry Analysis - 100% on RA    Cardiac Monitor:   Rate: 93 bpm  Rhythm: Normal Sinus Rhythm      Records Reviewed: Nursing Notes and Old Medical Records    Provider Notes (Medical Decision Making):     DDx: UTI, hypoglycemia, influenza    ED Course:   Initial assessment performed. The patients presenting problems have been discussed, and they are in agreement with the care plan formulated and outlined with them. I have encouraged them to ask questions as they arise throughout their visit. Disposition:    8:57 PM  Karly Ochoa's  results have been reviewed with her. She has been counseled regarding her diagnosis. She verbally conveys understanding and agreement of the signs, symptoms, diagnosis, treatment and prognosis and additionally agrees to follow up as recommended with Dr. Randee Swanson MD in 24 - 48 hours. She also agrees with the care-plan and conveys that all of her questions have been answered. I have also put together some discharge instructions for her that include: 1) educational information regarding their diagnosis, 2) how to care for their diagnosis at home, as well a 3) list of reasons why they would want to return to the ED prior to their follow-up appointment, should their condition change. PLAN:  1. Discharge home  Discharge Medication List as of 1/21/2018  9:19 PM      START taking these medications    Details   !! cephALEXin (KEFLEX) 500 mg capsule Take 1 Cap by mouth four (4) times daily for 7 days. , Normal, Disp-28 Cap, R-0      !! fluconazole (DIFLUCAN) 100 mg tablet Take 1.5 Tabs by mouth once for 1 dose., Normal, Disp-1 Tab, R-1      !! fluconazole (DIFLUCAN) 100 mg tablet Take 1 Tab by mouth daily for 2 days. FDA advises cautious prescribing of oral fluconazole in pregnancy. , Normal, Disp-2 Tab, R-0      !! cephALEXin (KEFLEX) 500 mg capsule Take 1 Cap by mouth four (4) times daily for 7 days. , Normal, Disp-28 Cap, R-0       !! - Potential duplicate medications found. Please discuss with provider.       CONTINUE these medications which have NOT CHANGED    Details   insulin glargine (LANTUS SOLOSTAR) 100 unit/mL (3 mL) inpn 20 Units by SubCUTAneous route daily. , Historical Med      glipiZIDE (GLUCOTROL) 10 mg tablet Take 1 Tab by mouth two (2) times a day., Normal, Disp-180 Tab, R-3      metFORMIN ER (GLUCOPHAGE XR) 500 mg tablet Take 2 Tabs by mouth two (2) times a day., Normal, Disp-360 Tab, R-3      rosuvastatin (CRESTOR) 10 mg tablet nightly., Historical Med      omeprazole (PRILOSEC) 20 mg capsule Historical Med      amLODIPine (NORVASC) 10 mg tablet daily. , Historical Med      hydrochlorothiazide (HYDRODIURIL) 12.5 mg tablet 12.5 mg daily. , Historical Med      valsartan (DIOVAN) 160 mg tablet 160 mg daily. , Historical Med      levothyroxine (SYNTHROID) 50 mcg tablet Take 50 mcg by mouth Daily (before breakfast). , Historical Med      ketoconazole (NIZORAL) 2 % shampoo as needed., Historical Med, R-0      aspirin delayed-release 81 mg tablet Take 81 mg by mouth daily. , Historical Med           2. Follow-up Information     Follow up With Details Comments Contact Info    Carlos Enrique Pace MD Call in 2 days As needed, If symptoms worsen [address] [place] [address] [phone]          Return to ED if worse     Diagnosis     Clinical Impression:   1. Acute cystitis without hematuria    2. Yeast cystitis        Attestations: This note is prepared by Meg Bajwa, acting as Scribe for [employer] Sandra Morton, [facility] Sandra Morton MD: The scribe's documentation has been prepared under my direction and personally reviewed by me in its entirety. I confirm that the note above accurately reflects all work, treatment, procedures, and medical decision making performed by me.

## 2018-01-22 NOTE — ED NOTES
Assumed care of pt at this time from triage. Pt states that she has been coping with the flu for the past few weeks and was recently started on lantus solostar last Saturday. Pt also takes oral hyperglycemics and has recently has fluctuating blood sugars from the 400's to the 90's. Pt states that she does has dizziness from time to time but not at this time. Assessment done at at this time. Pt complains of no pain.   Call bell in reach, daughter at bedside

## 2018-01-22 NOTE — ED NOTES
Pt discharged with written instructions at this time. Pt verbalizes understanding and all questions were answered. Discharged by Laine Post, in stable condition, with daughter.

## 2018-01-22 NOTE — TELEPHONE ENCOUNTER
While she is recovering from this infection, it is not uncommon to have the higher blood sugars. Increase her Lantus from 20 units to 22 units for now. If her blood sugars in the morning stay above 150 for the next 2 days, then increase her Lantus to 24 units. If she starts to have blood sugars under 70 call and let us know.

## 2018-01-24 LAB
BACTERIA SPEC CULT: ABNORMAL
CC UR VC: ABNORMAL
SERVICE CMNT-IMP: ABNORMAL

## 2018-01-27 ENCOUNTER — TELEPHONE (OUTPATIENT)
Dept: ENDOCRINOLOGY | Age: 78
End: 2018-01-27

## 2018-01-27 NOTE — TELEPHONE ENCOUNTER
Patient paged on Saturday approx 10:30 AM regarding her AM blood sugars this week being on the low side. She notes that over the past few days her AM sugars have mostly been in the 80's and today is in the 60's. She reports taking lantus 22 units, metformin, and glipizide. She describes believing that her use of lantus is only temporary. She notes that she only has about 1-2 more days worth of lantus remaining. Plan:   I have advised her to reduce her lantus to 18 units for the present time. I have offered to send a refill on her lantus but she indicated she does not want for me to send it for her. Instead, she wants to discuss with Dr. Kalyn Massey on Monday whether she will stay on lantus anymore. I recommended that she make sure to at least take tonight and sundays dose which she will do. I advised her that if she has any further concerns to let me know. Irma Page.  [address] Endocrinology  [address] [place] [address]

## 2018-01-29 ENCOUNTER — TELEPHONE (OUTPATIENT)
Dept: ENDOCRINOLOGY | Age: 78
End: 2018-01-29

## 2018-01-29 NOTE — TELEPHONE ENCOUNTER
Spoke with pt this morning. She notes she did not take her Lantus on Saturday or Sunday. Her FGB on Thursday was 94, on Friday was 89 on Saturday was 64 and on Sunday was 101. She has not taken any Lantus since Friday morning. Pt is still taking the Metformin and Glipizide. Pt has completed ABx for a UTI and has recovered from the Flu. Pt instructed to stop the Lantus, continue to check her BGs twice per day, continue her Metformin and Glipizide and call us on Friday with her BG readings. Pt voices understanding and agreement with the plan.

## 2018-01-31 ENCOUNTER — TELEPHONE (OUTPATIENT)
Dept: ENDOCRINOLOGY | Age: 78
End: 2018-01-31

## 2018-01-31 NOTE — TELEPHONE ENCOUNTER
Patient has been advised of good BS readings. She states that she had been unable to take Metformin 2 BID, but will try to add an extra Metformin in AM with breakfast so that she will take 1000mg in Am and 500 mg in PM, if can tolerate.

## 2018-02-26 ENCOUNTER — OFFICE VISIT (OUTPATIENT)
Dept: ENDOCRINOLOGY | Age: 78
End: 2018-02-26

## 2018-02-26 VITALS
BODY MASS INDEX: 29.49 KG/M2 | HEIGHT: 60 IN | WEIGHT: 150.2 LBS | DIASTOLIC BLOOD PRESSURE: 74 MMHG | HEART RATE: 94 BPM | SYSTOLIC BLOOD PRESSURE: 104 MMHG

## 2018-02-26 DIAGNOSIS — I10 ESSENTIAL HYPERTENSION WITH GOAL BLOOD PRESSURE LESS THAN 140/90: ICD-10-CM

## 2018-02-26 DIAGNOSIS — E78.5 HYPERLIPIDEMIA, UNSPECIFIED HYPERLIPIDEMIA TYPE: ICD-10-CM

## 2018-02-26 DIAGNOSIS — E03.9 HYPOTHYROIDISM (ACQUIRED): ICD-10-CM

## 2018-02-26 DIAGNOSIS — E11.9 TYPE 2 DIABETES MELLITUS WITHOUT COMPLICATION, WITHOUT LONG-TERM CURRENT USE OF INSULIN (HCC): Primary | ICD-10-CM

## 2018-02-26 LAB — HBA1C MFR BLD HPLC: 8.6 %

## 2018-02-26 NOTE — MR AVS SNAPSHOT
[address] [facility] [address] [id number] [phone] Patient: Nesha Sanchez MRN: [id number] MVT:[id number] Visit Information Date & Time Provider Department Dept. Phone Encounter #  
 2/26/2018 11:10 AM Jackie Brush, [address] [place] [address] Diabetes and Endocrinology [phone] [id number] Follow-up Instructions Return in about 3 months (around 5/26/2018). Upcoming Health Maintenance Date Due DTaP/Tdap/Td series (1 - Tdap) 4/28/1961 ZOSTER VACCINE AGE 60> 2/28/2002 OSTEOPOROSIS SCREENING (DEXA) 4/28/2007 Pneumococcal 65+ Low/Medium Risk (1 of 2 - PCV13) 4/28/2007 MEDICARE YEARLY EXAM 4/28/2007 Influenza Age 5 to Adult 8/1/2017 EYE EXAM RETINAL OR DILATED Q1 4/3/2018 HEMOGLOBIN A1C Q6M 5/27/2018 MICROALBUMIN Q1 6/30/2018 FOOT EXAM Q1 11/27/2018 LIPID PANEL Q1 2/26/2019 GLAUCOMA SCREENING Q2Y 4/3/2019 Allergies as of 2/26/2018  Review Complete On: 2/26/2018 By: Jackie Brush MD  
 No Known Allergies Current Immunizations  Reviewed on 4/27/2015 No immunizations on file. Not reviewed this visit You Were Diagnosed With   
  
 Codes Comments Type 2 diabetes mellitus without complication, without long-term current use of insulin (HCC)    -  Primary ICD-10-CM: E11.9 ICD-9-CM: 250.00 Hypothyroidism (acquired)     ICD-10-CM: E03.9 ICD-9-CM: 244.9 Essential hypertension with goal blood pressure less than 140/90     ICD-10-CM: I10 
ICD-9-CM: 401.9 Hyperlipidemia, unspecified hyperlipidemia type     ICD-10-CM: E78.5 ICD-9-CM: 272.4 Vitals BP Pulse Height(growth percentile) Weight(growth percentile) BMI OB Status 104/74 94 5' (1.524 m) 150 lb 3.2 oz (68.1 kg) 29.33 kg/m2 Postmenopausal  
 Smoking Status Never Smoker Vitals History BMI and BSA Data  Body Mass Index Body Surface Area  
 29.33 kg/m 2 1.7 m 2  
  
 Preferred Pharmacy Pharmacy Name Phone [facility], [address] [facility] [phone] Your Updated Medication List  
  
   
This list is accurate as of 2/26/18 11:44 AM.  Always use your most recent med list. amLODIPine 10 mg tablet Commonly known as:  NORVASC  
daily. aspirin delayed-release 81 mg tablet Take 81 mg by mouth daily. glipiZIDE 10 mg tablet Commonly known as:  Oscar Barbone Take 1 Tab by mouth two (2) times a day. glucose blood VI test strips strip Commonly known as:  ACCU-CHEK NATE Check blood sugar twice a day. DX:E11.9  
  
 hydroCHLOROthiazide 12.5 mg tablet Commonly known as:  HYDRODIURIL  
12.5 mg daily. ketoconazole 2 % shampoo Commonly known as:  NIZORAL  
as needed. LANTUS SOLOSTAR U-100 INSULIN 100 unit/mL (3 mL) Inpn Generic drug:  insulin glargine 20 Units by SubCUTAneous route daily. levothyroxine 50 mcg tablet Commonly known as:  SYNTHROID Take 50 mcg by mouth Daily (before breakfast). metFORMIN  mg tablet Commonly known as:  GLUCOPHAGE XR Take 2 Tabs by mouth two (2) times a day. omeprazole 20 mg capsule Commonly known as:  PRILOSEC  
  
 rosuvastatin 10 mg tablet Commonly known as:  CRESTOR  
nightly. valsartan 160 mg tablet Commonly known as:  DIOVAN  
160 mg daily. We Performed the Following AMB POC HEMOGLOBIN A1C [47821 CPT(R)]  DIABETES FOOT EXAM [7 Custom] LIPID PANEL [92933 CPT(R)] METABOLIC PANEL, COMPREHENSIVE [49523 CPT(R)] MICROALBUMIN, UR, RAND W/ MICROALB/CREAT RATIO [id number] CPT(R)] T4, FREE A1109817 CPT(R)] TSH 3RD GENERATION [41324 CPT(R)] Follow-up Instructions Return in about 3 months (around 5/26/2018). Patient Instructions Continue the Glipizide 10mg with breakfast and 10mg with dinner Continue the Metformin 4 pills per day. Send me some blood sugar readings in 3 weeks. Introducing [facility]! Odessa Hardy introduces [facility] patient portal. Now you can access parts of your medical record, email your doctor's office, and request medication refills online. 1. In your internet browser, go to [email or web address]/[facility] [employer] 2. Click on the First Time User? Click Here link in the Sign In box. You will see the New Member Sign Up page. 3. Enter your [facility] Access Code exactly as it appears below. You will not need to use this code after youve completed the sign-up process. If you do not sign up before the expiration date, you must request a new code. · [facility] Access Code: [email or web address] Expires: 5/27/2018 11:44 AM 
 
4. Enter the last four digits of your Social Security Number (xxxx) and Date of Birth (mm/dd/yyyy) as indicated and click Submit. You will be taken to the next sign-up page. 5. Create a [facility] ID. This will be your [facility] login ID and cannot be changed, so think of one that is secure and easy to remember. 6. Create a [facility] password. You can change your password at any time. 7. Enter your Password Reset Question and Answer. This can be used at a later time if you forget your password. 8. Enter your e-mail address. You will receive e-mail notification when new information is available in [address] 9. Click Sign Up. You can now view and download portions of your medical record. 10. Click the Download Summary menu link to download a portable copy of your medical information. If you have questions, please visit the Frequently Asked Questions section of the [facility] website. Remember, [facility] is NOT to be used for urgent needs. For medical emergencies, dial 911. Now available from your iPhone and Android! Please provide this summary of care documentation to your next provider. Your primary care clinician is listed as Hilda Suh. If you have any questions after today's visit, please call [phone].

## 2018-02-26 NOTE — LETTER
2/28/2018 10:34 AM 
 
Ms. Moe Mckeon [phone] [id number] Dear Moe Arita: Please find your most recent results below. Resulted Orders AMB POC HEMOGLOBIN A1C Result Value Ref Range Hemoglobin A1c (POC) 8.6 % MICROALBUMIN, UR, RAND W/ MICROALB/CREAT RATIO Result Value Ref Range Creatinine, urine 174.9 Not Estab. mg/dL Microalbumin, urine 10.9 Not Estab. ug/mL Microalb/Creat ratio (ug/mg creat.) 6.2 0.0 - 30.0 mg/g creat Narrative Performed at:  [address]  [id number] : Adam Abdul MD, Phone:  [phone] RECOMMENDATIONS: 
 
Notes Recorded by Joseluis Moncada MD on 2/27/2018 at 4:39 PM 
 
Your urine test did not show any evidence of protein, which is a good finding. Please call me if you have any questions: [phone] Sincerely, Joseluis Moncada MD

## 2018-02-26 NOTE — PATIENT INSTRUCTIONS
Continue the Glipizide 10mg with breakfast and 10mg with dinner  Continue the Metformin 4 pills per day. Send me some blood sugar readings in 3 weeks.

## 2018-02-26 NOTE — PROGRESS NOTES
Chief Complaint   Patient presents with    Diabetes     pcp and pharmacy verified. eye exam UTD   Records since last visit reviewed  History of Present Illness: Lisbet Araujo is a 78 y.o. female here for follow up of diabetes and hypothyroidism. She was diagnosed with diabetes around 2012. At our last visit in June 2017 her A1C was 8.8% on Metformin 1000mg BID and Glipizide 7.5mg daily. At our last visit in November 2017 her A1C was 9.0% on Glipizide 10mg BID and Metformin 500mg BID. She had issues of severely elevated BGs into the 400's in January and was started on Lantus 20 units daily. She was also found to have a UTI. On the Lantus 20 units she was starting to have low BGs so she stopped taking the Lantus. Her A1C today was 8.6%. Pt brought in her BG logs today. Her FBGs have been in the 's range, with pre lunch in the 100-200 range, pre dinner in the  range and HS in the  range. She is currently taking Pt is taking the Glipizide 10mg BID and Metformin 500mg with breakfast 500mg two hours after breakfast, 500mg with dinner and 500mg at bedtime. Pt is eating 2-3 meals per day. She has breakfast around 10AM, yesterday she had two eggs and 5 crackers  She will have \"bruch\" around 230-3PM. Yesterday she had 1/2 ham and cheese sub and diet soda. She will occasionally have a piece of fruit, nabs or yogurt in the afternoon  She has dinner around 7PM, last night she had the other 1/2 of the ham and cheese sub. Pt goes to bed 11PM.    She has joined a gym and she plans to restart her waking and exercise regimen. No history of vascular disease. No history of neuropathy, or nephropathy. Last eye exam was April 2017, no retinopathy. Pt has hx of cataracts removed in 2012. Pt was diagnosed with hypothyroidism in May 2015 by her PCP.  She was on LT4 50mcg in June 2017 and her TFTs were at goal.  She notes that when she lays down at night \"I feel like my heart is beating too fast\". She denies issues of tremors, CP, SOB, heat or cold intolerance, diarrhea or constipation. Pt had a colonoscopy in May 2016. She was found to have diverticulosis but no concerning findings. Current Outpatient Prescriptions   Medication Sig    glucose blood VI test strips (ACCU-CHEK NATE) strip Check blood sugar twice a day. DX:E11.9    glipiZIDE (GLUCOTROL) 10 mg tablet Take 1 Tab by mouth two (2) times a day.  metFORMIN ER (GLUCOPHAGE XR) 500 mg tablet Take 2 Tabs by mouth two (2) times a day. (Patient taking differently: Take 1,000 mg by mouth two (2) times a day. 2 in Am and 1 in PM)    rosuvastatin (CRESTOR) 10 mg tablet nightly.  omeprazole (PRILOSEC) 20 mg capsule     amLODIPine (NORVASC) 10 mg tablet daily.  hydrochlorothiazide (HYDRODIURIL) 12.5 mg tablet 12.5 mg daily.  valsartan (DIOVAN) 160 mg tablet 160 mg daily.  levothyroxine (SYNTHROID) 50 mcg tablet Take 50 mcg by mouth Daily (before breakfast).  aspirin delayed-release 81 mg tablet Take 81 mg by mouth daily.  insulin glargine (LANTUS SOLOSTAR) 100 unit/mL (3 mL) inpn 20 Units by SubCUTAneous route daily.  ketoconazole (NIZORAL) 2 % shampoo as needed. No current facility-administered medications for this visit. No Known Allergies  Review of Systems:  - Eyes: no blurry vision or double vision  - Cardiovascular: no chest pain  - Respiratory: no shortness of breath  - Musculoskeletal: no myalgias  - Neurological: no numbness/tingling in extremities    Physical Examination:  Blood pressure 104/74, pulse 94, height 5' (1.524 m), weight 150 lb 3.2 oz (68.1 kg).   - General: pleasant, no distress, good eye contact   - Neck: no carotid bruits  - Cardiovascular: regular, normal rate, nl s1 and s2, no m/r/g, 2+ DP pulses   - Respiratory: clear bilaterally  - Integumentary: no edema, no foot ulcers, sensation to monofilament and vibration intact bilaterally  - Psychiatric: normal mood and affect    Data Reviewed:   Her A1C today was 8.6%. Assessment/Plan:   1) DM > Her A1C today was 8.6%. Pt is not having any hypoglycemia. Our goal A1C is 8.0% or less. With the erratic and high BGs she had in January I don't want to make any changes at this time. Will continue the Glipizide 10mg BID and Metformin 2000mg daily. Pt to check her BG twice daily and mail her BG logs to me in 3 weeks. 2) Hypothyroidism > Pt is clinically euthyroid on LT4 50mcg daily. Her TFTs were at goal in June 2017. Will order TFTs    3) HTN > BP at goal on her current regimen. Will check a urine MA today. 4) HLD > Pt is on Rosuvastatin 10mg daily which she is tolerating well. Her LDL in March 2017 was at goal. Will order lipid panel and CMP. Pt voices understanding and agreement with the plan. We spent 40 minutes of face to face time together and > 50% of the time was spent in counseling on the above issues. RTC 3 months    Patient Instructions   Continue the Glipizide 10mg with breakfast and 10mg with dinner  Continue the Metformin 4 pills per day. Send me some blood sugar readings in 3 weeks. Follow-up Disposition:  Return in about 3 months (around 5/26/2018).     Copy sent to:  Dr. Ciara Bradley

## 2018-02-27 LAB
ALBUMIN/CREAT UR: 6.2 MG/G CREAT (ref 0–30)
CREAT UR-MCNC: 174.9 MG/DL
MICROALBUMIN UR-MCNC: 10.9 UG/ML

## 2018-09-27 LAB
CREATININE, EXTERNAL: 0.83
HBA1C MFR BLD HPLC: 7.8 %
LDL-C, EXTERNAL: 82

## 2018-10-18 RX ORDER — GLIPIZIDE 10 MG/1
TABLET ORAL
Qty: 180 TAB | Refills: 0 | Status: SHIPPED | OUTPATIENT
Start: 2018-10-18 | End: 2018-12-20 | Stop reason: SDUPTHER

## 2018-11-01 ENCOUNTER — OFFICE VISIT (OUTPATIENT)
Dept: ENDOCRINOLOGY | Age: 78
End: 2018-11-01

## 2018-11-01 VITALS
SYSTOLIC BLOOD PRESSURE: 130 MMHG | BODY MASS INDEX: 31.22 KG/M2 | DIASTOLIC BLOOD PRESSURE: 80 MMHG | HEIGHT: 60 IN | HEART RATE: 82 BPM | WEIGHT: 159 LBS

## 2018-11-01 DIAGNOSIS — E11.9 TYPE 2 DIABETES MELLITUS WITHOUT COMPLICATION, WITHOUT LONG-TERM CURRENT USE OF INSULIN (HCC): Primary | ICD-10-CM

## 2018-11-01 DIAGNOSIS — E78.5 HYPERLIPIDEMIA, UNSPECIFIED HYPERLIPIDEMIA TYPE: ICD-10-CM

## 2018-11-01 DIAGNOSIS — E03.9 HYPOTHYROIDISM (ACQUIRED): ICD-10-CM

## 2018-11-01 DIAGNOSIS — I10 ESSENTIAL HYPERTENSION WITH GOAL BLOOD PRESSURE LESS THAN 140/90: ICD-10-CM

## 2018-11-01 RX ORDER — LOSARTAN POTASSIUM 50 MG/1
TABLET ORAL
COMMUNITY
Start: 2018-07-29 | End: 2018-12-21 | Stop reason: SDUPTHER

## 2018-11-01 NOTE — PROGRESS NOTES
Blood pressure has been repeated in opposite arm. No symptoms, per patient. Patient states no meds this am, except thyroid med.

## 2018-11-01 NOTE — PROGRESS NOTES
Chief Complaint Patient presents with  Diabetes  
  pcp and pharmacy verified. Eye exam appt this weekend Records since last visit reviewed History of Present Illness: Ana López is a 70 y.o. female here for follow up of diabetes and hypothyroidism. She was diagnosed with diabetes around 2012. At our last visit in February 2018 her A1C was 8.6% on Metformin 2000mg daily and Glipizide 10mg daily. Her BGs were erratic, but was not having low BGs. We discussed low carb dieting and lifestyle changes, but did not change her regimen. In September her A1C was down to 7.8%. Pt brought her BG logs with her today. Her FBGs have been in the 's range. She has not had any episodes of BGs under 70. She is currently taking Pt is taking the Glipizide 10mg BID and Metformin 500mg with breakfast 500mg two hours after breakfast, 500mg with dinner and 500mg at bedtime. Pt is eating 2-3 meals per day. She has brunch around 11AM, yesterday she had a BBQ sandwich, a banana, lemonade and cake. She will have an afternoon snack of fruit She has dinner around 7PM, last night she had \"oodles of noodles\" and lemonade. Pt goes to bed 11PM. Her gym was closed and she is looking for a new one. She notes she has not been walking much, because of the weather. No history of vascular disease. No history of neuropathy, or nephropathy. Last eye exam was April 2017, no retinopathy. She has an appointment for this week. Pt has hx of cataracts removed in 2012. Pt was diagnosed with hypothyroidism in May 2015 by her PCP. In September 2018 her TSH was 3.97 on LT4 50mcg daily. She denies issues of tremors, CP, SOB, heat or cold intolerance, diarrhea or constipation. Her Lipid panel in September 2018 was at goal (records reviewed). Current Outpatient Medications Medication Sig  
 losartan (COZAAR) 50 mg tablet  glipiZIDE (GLUCOTROL) 10 mg tablet TAKE 1 TABLET TWICE DAILY, WITH LUNCH AND DINNER  
 glucose blood VI test strips (ACCU-CHEK NATE) strip Check blood sugar twice a day. DX:E11.9  
 metFORMIN ER (GLUCOPHAGE XR) 500 mg tablet Take 2 Tabs by mouth two (2) times a day. (Patient taking differently: Take 1,000 mg by mouth two (2) times a day. 2 in Am and 1 in PM)  rosuvastatin (CRESTOR) 10 mg tablet nightly.  omeprazole (PRILOSEC) 20 mg capsule  amLODIPine (NORVASC) 10 mg tablet daily.  hydrochlorothiazide (HYDRODIURIL) 12.5 mg tablet 12.5 mg daily.  levothyroxine (SYNTHROID) 50 mcg tablet Take 50 mcg by mouth Daily (before breakfast).  ketoconazole (NIZORAL) 2 % shampoo as needed.  aspirin delayed-release 81 mg tablet Take 81 mg by mouth daily. No current facility-administered medications for this visit. No Known Allergies Review of Systems: - Eyes: no blurry vision or double vision - Cardiovascular: no chest pain - Respiratory: no shortness of breath - Musculoskeletal: no myalgias - Neurological: no numbness/tingling in extremities Physical Examination: 
Blood pressure 130/80, pulse 82, height 5' (1.524 m), weight 159 lb (72.1 kg). - General: pleasant, no distress, good eye contact  
- Neck: no carotid bruits - Cardiovascular: regular, normal rate, nl s1 and s2, no m/r/g, 2+ DP pulses - Respiratory: clear bilaterally - Integumentary: no edema, no foot ulcers,  
- Psychiatric: normal mood and affect Diabetic foot exam:  
 
Left Foot: 
 Visual Exam: normal  
 Pulse DP: 2+ (normal) Filament test: normal sensation Vibratory sensation: normal 
   
Right Foot: 
 Visual Exam: normal  
 Pulse DP: 2+ (normal) Filament test: normal sensation Vibratory sensation: normal 
 
 
 
Data Reviewed:  
Records from PCP reviewed. Assessment/Plan:  
1) DM > Her A1C has improved to 7.8%. Pt is not having any hypoglycemia. Our goal A1C is 8.0% or less. Pt to continue the Glipizide 10mg BID and Metformin 2000mg daily. Pt to check her BG twice daily and mail her BG logs to me in 3 weeks. 2) Hypothyroidism > Pt is clinically and biochemically euthyroid on LT4 50mcg daily. 3) HTN > BP at goal on her current regimen. Will check a urine MA today. 4) HLD > Pt is on Rosuvastatin 10mg daily which she is tolerating well. Her Lipid panel in September 2018 was at goal. 
 
Pt voices understanding and agreement with the plan. RTC 3 months Follow-up Disposition: 
Return in about 3 months (around 2/1/2019). Copy sent to: 
Dr. Mathew Xiong

## 2018-11-02 LAB
ALBUMIN/CREAT UR: 3.3 MG/G CREAT (ref 0–30)
CREAT UR-MCNC: 100.8 MG/DL
MICROALBUMIN UR-MCNC: 3.3 UG/ML

## 2018-12-20 RX ORDER — GLIPIZIDE 10 MG/1
TABLET ORAL
Qty: 180 TAB | Refills: 0 | Status: SHIPPED | OUTPATIENT
Start: 2018-12-20 | End: 2019-02-20 | Stop reason: SDUPTHER

## 2018-12-21 DIAGNOSIS — E03.9 HYPOTHYROIDISM (ACQUIRED): Primary | ICD-10-CM

## 2018-12-21 DIAGNOSIS — I10 ESSENTIAL HYPERTENSION WITH GOAL BLOOD PRESSURE LESS THAN 140/90: ICD-10-CM

## 2018-12-21 RX ORDER — LOSARTAN POTASSIUM 50 MG/1
50 TABLET ORAL DAILY
Qty: 90 TAB | Refills: 3 | Status: SHIPPED | OUTPATIENT
Start: 2018-12-21 | End: 2019-12-04 | Stop reason: SDUPTHER

## 2018-12-21 NOTE — TELEPHONE ENCOUNTER
Pt called asking a refills on her Rx Losartan send to [facility] mail order pharmacy. Any questions, she can be reach @ [phone].

## 2019-02-21 ENCOUNTER — OFFICE VISIT (OUTPATIENT)
Dept: ENDOCRINOLOGY | Age: 79
End: 2019-02-21

## 2019-02-21 VITALS
HEART RATE: 86 BPM | SYSTOLIC BLOOD PRESSURE: 126 MMHG | WEIGHT: 156.2 LBS | BODY MASS INDEX: 30.67 KG/M2 | HEIGHT: 60 IN | DIASTOLIC BLOOD PRESSURE: 81 MMHG

## 2019-02-21 DIAGNOSIS — I10 ESSENTIAL HYPERTENSION: ICD-10-CM

## 2019-02-21 DIAGNOSIS — E11.9 TYPE 2 DIABETES MELLITUS WITHOUT COMPLICATION, WITHOUT LONG-TERM CURRENT USE OF INSULIN (HCC): Primary | ICD-10-CM

## 2019-02-21 DIAGNOSIS — E78.5 HYPERLIPIDEMIA, UNSPECIFIED HYPERLIPIDEMIA TYPE: ICD-10-CM

## 2019-02-21 DIAGNOSIS — E03.9 HYPOTHYROIDISM (ACQUIRED): ICD-10-CM

## 2019-02-21 LAB — HBA1C MFR BLD HPLC: 8.4 %

## 2019-02-21 NOTE — PROGRESS NOTES
Chief Complaint Patient presents with  Diabetes  
  pcp and pharmacy verified. Eye exam: release signed  Thyroid Problem Records since last visit reviewed History of Present Illness: Meagan Restrepo is a 70 y.o. female here for follow up of diabetes and hypothyroidism. She was diagnosed with diabetes around 2012. At our last visit in November 2018 her A1C was down from 8.6% to 7.8% on Metformin 2000mg daily and Glipizide 10mg BID. Pt encouraged to keep up the good work. Her A1C today was up to 8.4%. Pt denies any issues of illnesses, injuries or hospitalizations since our last visit. Pt brought her BG logs with her today. Her FBGs have been in the 's range. 
men. She is currently taking Pt is taking the Glipizide 10mg in the AM and HS and Metformin 1000mg with breakfast and 1000mg with dinner and 500mg at bedtime. She notes she has been eating more sweets. Pt is eating 2 meals per day. She has brunch around 11AM-Noon, yesterday she had roast beef, gravy, asparagus and crystal lite. She will have an afternoon snack of fruit She has dinner around 6-7PM, last night she had a grilled cheese sandwich, some chips and regular soda. Pt goes to bed 11PM. Her gym was closed and she is looking for a new one. She notes she has not been walking much, because of the weather. No history of vascular disease. No history of neuropathy, or nephropathy. Last eye exam was January 2018, \"she told be my eyes looked good, no retinopathy. Will request these records. Pt has hx of cataracts removed in 2012. Pt was diagnosed with hypothyroidism in May 2015 by her PCP. In September 2018 her TSH was 3.97 on LT4 50mcg daily. She denies issues of tremors, CP, SOB, heat or cold intolerance, diarrhea or constipation. Her Lipid panel in September 2018 was at goal. She is still taking the Rosuvastatin 10mg daily. Current Outpatient Medications Medication Sig  
  glipiZIDE (GLUCOTROL) 10 mg tablet TAKE 1 TABLET TWICE DAILY, WITH LUNCH AND DINNER  
 losartan (COZAAR) 50 mg tablet Take 1 Tab by mouth daily.  glucose blood VI test strips (ACCU-CHEK NATE) strip Check blood sugar twice a day. DX:E11.9  
 metFORMIN ER (GLUCOPHAGE XR) 500 mg tablet Take 2 Tabs by mouth two (2) times a day. (Patient taking differently: Take 1,000 mg by mouth two (2) times a day. 2 in Am and 2 in PM)  rosuvastatin (CRESTOR) 10 mg tablet nightly.  omeprazole (PRILOSEC) 20 mg capsule  amLODIPine (NORVASC) 10 mg tablet daily.  levothyroxine (SYNTHROID) 50 mcg tablet Take 50 mcg by mouth Daily (before breakfast).  ketoconazole (NIZORAL) 2 % shampoo as needed.  aspirin delayed-release 81 mg tablet Take 81 mg by mouth daily. No current facility-administered medications for this visit. No Known Allergies Review of Systems: - Eyes: no blurry vision or double vision - Cardiovascular: no chest pain - Respiratory: no shortness of breath - Musculoskeletal: no myalgias - Neurological: no numbness/tingling in extremities Physical Examination: 
Blood pressure 126/81, pulse 86, height 5' (1.524 m), weight 156 lb 3.2 oz (70.9 kg). - General: pleasant, no distress, good eye contact  
- Neck: no carotid bruits - Cardiovascular: regular, normal rate, nl s1 and s2, no m/r/g, 2+ DP pulses - Respiratory: clear bilaterally - Integumentary: no edema, no foot ulcers,  
- Psychiatric: normal mood and affect Diabetic foot exam:  
 
Left Foot: 
 Visual Exam: normal  
 Pulse DP: 2+ (normal) Filament test: normal sensation Vibratory sensation: normal 
   
Right Foot: 
 Visual Exam: normal  
 Pulse DP: 2+ (normal) Filament test: normal sensation Vibratory sensation: normal 
 
 
 
Data Reviewed:  
Her A1C was 8.4% Assessment/Plan:  
1) DM > Her A1C has improved to 8.4%.  Pt is taking her PM Glipizide at bedtime, rather than with dinner and she has been eating more sweets and drinking regular sodas. Our goal A1C is 8.0% or less. Pt to continue the Glipizide 10mg BID and Metformin 2000mg daily. Pt to check her BG twice daily and mail her BG logs to me in 3 weeks. 2) Hypothyroidism > Pt is clinically and biochemically euthyroid on LT4 50mcg daily. Will check TFTs prior to our next visit. 3) HTN > BP at goal on her current regimen. 4) HLD > Pt is on Rosuvastatin 10mg daily which she is tolerating well. Her Lipid panel in September 2018 was at goal. 
 
Pt voices understanding and agreement with the plan. RTC 3 months Follow-up Disposition: 
Return in about 3 months (around 5/21/2019). Copy sent to: 
Dr. Jolley Leisure

## 2019-02-21 NOTE — PATIENT INSTRUCTIONS
1) Take glipizide, one pill in the morning and one pill with dinner. 2) Check your blood sugar at bedtime, and if your sugar is greater than 150, take 1/2 pill of your glipizide. 3) Send me some blood sugars in 3 weeks. 4) Your A1C was 8.4%.

## 2019-04-08 ENCOUNTER — TELEPHONE (OUTPATIENT)
Dept: ENDOCRINOLOGY | Age: 79
End: 2019-04-08

## 2019-04-08 NOTE — TELEPHONE ENCOUNTER
----- Message from Martínez Donovan sent at 4/8/2019  4:21 PM EDT -----  Regarding: Dr Rodrigues/rx refill  Pt p) [phone], pt said she is out of her Metformin medication and will need a refill called into her [facility]  Mail order pharmacy [phone], she has been out of the medication since last Thursday night and she is worried her sugar levels may be increasing

## 2019-04-09 RX ORDER — METFORMIN HYDROCHLORIDE 500 MG/1
1000 TABLET, EXTENDED RELEASE ORAL 2 TIMES DAILY
Qty: 360 TAB | Refills: 3 | Status: SHIPPED | OUTPATIENT
Start: 2019-04-09 | End: 2019-04-09

## 2019-04-09 RX ORDER — METFORMIN HYDROCHLORIDE 500 MG/1
1000 TABLET, EXTENDED RELEASE ORAL 2 TIMES DAILY
Qty: 360 TAB | Refills: 3 | Status: SHIPPED | OUTPATIENT
Start: 2019-04-09 | End: 2019-04-10 | Stop reason: SDUPTHER

## 2019-04-09 NOTE — TELEPHONE ENCOUNTER
4/9/2019  11:03 AM        Please see message below from answering service regarding a refill.             Thanks

## 2019-04-09 NOTE — TELEPHONE ENCOUNTER
----- Message from Vicki Yuri sent at 4/9/2019 10:48 AM EDT -----  Regarding: Dr Samira Martin rx refill  Pt (p) [phone], requesting rx refill for her Metformin ,for her Mail order pharmacy with [facility],(p) [phone]  the one listed in her chart. Pt said she is out of her medication now and if she does not take it her sugar goes up high.  Pt would also like to know if there are any samples she can take until her mail order comes in

## 2019-04-09 NOTE — TELEPHONE ENCOUNTER
[facility]. Let us know if she wants a 30 day supply of Metformin sent to pharmacy or just 2 weeks worth.

## 2019-04-10 RX ORDER — METFORMIN HYDROCHLORIDE 500 MG/1
1000 TABLET, EXTENDED RELEASE ORAL 2 TIMES DAILY
Qty: 40 TAB | Refills: 0 | Status: SHIPPED | OUTPATIENT
Start: 2019-04-10 | End: 2019-04-10 | Stop reason: SDUPTHER

## 2019-04-10 NOTE — TELEPHONE ENCOUNTER
----- Message from Derick Gtz sent at 4/9/2019  4:54 PM EDT -----  Regarding: Dr Rodrigues/rx  refill  Pt (p) [phone], would like to know if a rx for Metformin 500 mg  For her local pharmacy for [facility] and [address] and [address] [place] [address]  [phone], for  One week supply  to hold her until her mail order comes thru , she  Has not had any medication since Thursday  And needs it called in as soon as possible

## 2019-05-21 DIAGNOSIS — E03.9 HYPOTHYROIDISM (ACQUIRED): ICD-10-CM

## 2019-05-21 DIAGNOSIS — E78.5 HYPERLIPIDEMIA, UNSPECIFIED HYPERLIPIDEMIA TYPE: ICD-10-CM

## 2019-05-21 DIAGNOSIS — I10 ESSENTIAL HYPERTENSION: ICD-10-CM

## 2019-05-21 DIAGNOSIS — E11.9 TYPE 2 DIABETES MELLITUS WITHOUT COMPLICATION, WITHOUT LONG-TERM CURRENT USE OF INSULIN (HCC): ICD-10-CM

## 2019-05-30 LAB
ALBUMIN SERPL-MCNC: 4.4 G/DL (ref 3.5–4.8)
ALBUMIN/CREAT UR: 7.1 MG/G CREAT (ref 0–30)
ALBUMIN/GLOB SERPL: 1.4 {RATIO} (ref 1.2–2.2)
ALP SERPL-CCNC: 94 IU/L (ref 39–117)
ALT SERPL-CCNC: 12 IU/L (ref 0–32)
AST SERPL-CCNC: 14 IU/L (ref 0–40)
BILIRUB SERPL-MCNC: 0.3 MG/DL (ref 0–1.2)
BUN SERPL-MCNC: 14 MG/DL (ref 8–27)
BUN/CREAT SERPL: 16 (ref 12–28)
CALCIUM SERPL-MCNC: 10.1 MG/DL (ref 8.7–10.3)
CHLORIDE SERPL-SCNC: 101 MMOL/L (ref 96–106)
CHOLEST SERPL-MCNC: 141 MG/DL (ref 100–199)
CO2 SERPL-SCNC: 23 MMOL/L (ref 20–29)
CREAT SERPL-MCNC: 0.88 MG/DL (ref 0.57–1)
CREAT UR-MCNC: 310.1 MG/DL
EST. AVERAGE GLUCOSE BLD GHB EST-MCNC: 232 MG/DL
GLOBULIN SER CALC-MCNC: 3.2 G/DL (ref 1.5–4.5)
GLUCOSE SERPL-MCNC: 126 MG/DL (ref 65–99)
HBA1C MFR BLD: 9.7 % (ref 4.8–5.6)
HDLC SERPL-MCNC: 48 MG/DL
INTERPRETATION, 910389: NORMAL
LDLC SERPL CALC-MCNC: 67 MG/DL (ref 0–99)
Lab: NORMAL
MICROALBUMIN UR-MCNC: 22 UG/ML
POTASSIUM SERPL-SCNC: 4.3 MMOL/L (ref 3.5–5.2)
PROT SERPL-MCNC: 7.6 G/DL (ref 6–8.5)
SODIUM SERPL-SCNC: 144 MMOL/L (ref 134–144)
T4 FREE SERPL-MCNC: 1.46 NG/DL (ref 0.82–1.77)
TRIGL SERPL-MCNC: 130 MG/DL (ref 0–149)
TSH SERPL DL<=0.005 MIU/L-ACNC: 1.82 UIU/ML (ref 0.45–4.5)
VLDLC SERPL CALC-MCNC: 26 MG/DL (ref 5–40)

## 2019-05-31 ENCOUNTER — OFFICE VISIT (OUTPATIENT)
Dept: ENDOCRINOLOGY | Age: 79
End: 2019-05-31

## 2019-05-31 VITALS
HEART RATE: 92 BPM | WEIGHT: 153 LBS | SYSTOLIC BLOOD PRESSURE: 120 MMHG | HEIGHT: 60 IN | BODY MASS INDEX: 30.04 KG/M2 | DIASTOLIC BLOOD PRESSURE: 75 MMHG

## 2019-05-31 DIAGNOSIS — E78.5 HYPERLIPIDEMIA, UNSPECIFIED HYPERLIPIDEMIA TYPE: ICD-10-CM

## 2019-05-31 DIAGNOSIS — E11.9 TYPE 2 DIABETES MELLITUS WITHOUT COMPLICATION, WITHOUT LONG-TERM CURRENT USE OF INSULIN (HCC): Primary | ICD-10-CM

## 2019-05-31 DIAGNOSIS — I10 ESSENTIAL HYPERTENSION: ICD-10-CM

## 2019-05-31 DIAGNOSIS — E03.9 HYPOTHYROIDISM (ACQUIRED): ICD-10-CM

## 2019-05-31 RX ORDER — HYDROCHLOROTHIAZIDE 12.5 MG/1
12.5 TABLET ORAL DAILY
COMMUNITY

## 2019-05-31 RX ORDER — PHENTERMINE HYDROCHLORIDE 15 MG/1
15 CAPSULE ORAL
Qty: 30 CAP | Refills: 11 | Status: SHIPPED | OUTPATIENT
Start: 2019-05-31 | End: 2019-11-04

## 2019-05-31 NOTE — PATIENT INSTRUCTIONS
1) Phentermine: Take one pill 1/2 an hour before your brunch/first meal of the day. Let me know if you have any problems with increase heart rate, blood pressure, anxiety or trouble sleeping as these can be side effects. If symptoms are mild, you body should acclimate to this and any related symptoms should improve.

## 2019-05-31 NOTE — PROGRESS NOTES
Chief Complaint   Patient presents with    Diabetes     pcp and pharmacy verified. Release signed for eye exam   Records since last visit reviewed  History of Present Illness: Andrew Robertson is a 70 y.o. female here for follow up of diabetes and hypothyroidism. She was diagnosed with diabetes around 2012. Her A1C last week was up to 9.7%. \"I have been eating a lot of sweets and I have not been walking either\". Pt denies any issues of illnesses, injuries or hospitalizations since our last visit. She brought her BG logs with her today. Her FBGs have been in the 130-160's range. \"My night time sugars were so high that I did not want to see them. \"    She is currently taking Pt is taking the Glipizide 10mg in the AM and 10mg with dinner and Metformin 1000mg with breakfast and 1000mg with dinner. She notes she has been eating more sweets. Pt is eating 2 meals per day. She has brunch around 11AM-Noon, yesterday she had roast beef, gravy, asparagus and crystal lite. She will snack during the day. She has dinner around 6-7PM, last night she had a grilled cheese sandwich, some chips and regular soda. She will snack in the evening as well. Pt goes to bed 11PM.    She notes she has not been walking much, because of the weather. No history of vascular disease. No history of neuropathy, or nephropathy. Last eye exam was December 2018, \"she told be my eyes looked good, no retinopathy. Will request these records. Pt has hx of cataracts removed in 2012. Pt was diagnosed with hypothyroidism in May 2015 by her PCP. In May 2019 her TSH was 1.82 with FT4 of 1.46 on LT4 50mcg daily. She denies issues of tremors, CP, SOB, heat or cold intolerance, diarrhea or constipation. Her Lipid panel in May 2019 was at goal. She is still taking the Rosuvastatin 10mg daily. Current Outpatient Medications   Medication Sig    hydroCHLOROthiazide (HYDRODIURIL) 12.5 mg tablet Take 12.5 mg by mouth daily.  metFORMIN ER (GLUCOPHAGE XR) 500 mg tablet TAKE 2 TABLETS BY MOUTH TWICE DAILY; TAKE 2 TABLETS BY MOUTH IN THE AM AND TAKE 2 TABLETS BY MOUTH IN THE PM    glipiZIDE (GLUCOTROL) 10 mg tablet TAKE 1 TABLET TWICE DAILY, WITH LUNCH AND DINNER    losartan (COZAAR) 50 mg tablet Take 1 Tab by mouth daily.  glucose blood VI test strips (ACCU-CHEK NATE) strip Check blood sugar twice a day. DX:E11.9    rosuvastatin (CRESTOR) 10 mg tablet nightly.  omeprazole (PRILOSEC) 20 mg capsule     levothyroxine (SYNTHROID) 50 mcg tablet Take 50 mcg by mouth Daily (before breakfast).  aspirin delayed-release 81 mg tablet Take 81 mg by mouth daily.  amLODIPine (NORVASC) 10 mg tablet daily. No current facility-administered medications for this visit. No Known Allergies  Review of Systems:  - Eyes: no blurry vision or double vision  - Cardiovascular: no chest pain  - Respiratory: no shortness of breath  - Musculoskeletal: no myalgias  - Neurological: no numbness/tingling in extremities    Physical Examination:  Blood pressure 120/75, pulse 92, height 5' (1.524 m), weight 153 lb (69.4 kg).   - General: pleasant, no distress, good eye contact   - Neck: no carotid bruits  - Cardiovascular: regular, normal rate, nl s1 and s2, no m/r/g, 2+ DP pulses   - Respiratory: clear bilaterally  - Integumentary: no edema, no foot ulcers,   - Psychiatric: normal mood and affect    Diabetic foot exam:     Left Foot:   Visual Exam: normal    Pulse DP: 2+ (normal)   Filament test: normal sensation    Vibratory sensation: normal      Right Foot:   Visual Exam: normal    Pulse DP: 2+ (normal)   Filament test: normal sensation    Vibratory sensation: normal        Data Reviewed:   Component      Latest Ref Rng & Units 5/29/2019 5/29/2019 5/29/2019 5/29/2019           3:07 PM  3:07 PM  3:07 PM  3:07 PM   Glucose      65 - 99 mg/dL       BUN      8 - 27 mg/dL       Creatinine      0.57 - 1.00 mg/dL       GFR est non-AA      >59 mL/min/1.73       GFR est AA      >59 mL/min/1.73       BUN/Creatinine ratio      12 - 28       Sodium      134 - 144 mmol/L       Potassium      3.5 - 5.2 mmol/L       Chloride      96 - 106 mmol/L       CO2      20 - 29 mmol/L       Calcium      8.7 - 10.3 mg/dL       Protein, total      6.0 - 8.5 g/dL       Albumin      3.5 - 4.8 g/dL       GLOBULIN, TOTAL      1.5 - 4.5 g/dL       A-G Ratio      1.2 - 2.2       Bilirubin, total      0.0 - 1.2 mg/dL       Alk. phosphatase      39 - 117 IU/L       AST      0 - 40 IU/L       ALT (SGPT)      0 - 32 IU/L       Cholesterol, total      100 - 199 mg/dL       Triglyceride      0 - 149 mg/dL       HDL Cholesterol      >39 mg/dL       VLDL, calculated      5 - 40 mg/dL       LDL, calculated      0 - 99 mg/dL       Creatinine, urine      Not Estab. mg/dL 310.1      Microalbumin, urine      Not Estab. ug/mL 22.0      Microalbumin/Creat. Ratio      0.0 - 30.0 mg/g creat 7.1      Hemoglobin A1c, (calculated)      4.8 - 5.6 %  9.7 (H)     Estimated average glucose      mg/dL  232     TSH      0.450 - 4.500 uIU/mL    1.820   T4, Free      0.82 - 1.77 ng/dL   1.46      Component      Latest Ref Rng & Units 5/29/2019 5/29/2019           3:07 PM  3:07 PM   Glucose      65 - 99 mg/dL 126 (H)    BUN      8 - 27 mg/dL 14    Creatinine      0.57 - 1.00 mg/dL 0.88    GFR est non-AA      >59 mL/min/1.73 64    GFR est AA      >59 mL/min/1.73 73    BUN/Creatinine ratio      12 - 28 16    Sodium      134 - 144 mmol/L 144    Potassium      3.5 - 5.2 mmol/L 4.3    Chloride      96 - 106 mmol/L 101    CO2      20 - 29 mmol/L 23    Calcium      8.7 - 10.3 mg/dL 10.1    Protein, total      6.0 - 8.5 g/dL 7.6    Albumin      3.5 - 4.8 g/dL 4.4    GLOBULIN, TOTAL      1.5 - 4.5 g/dL 3.2    A-G Ratio      1.2 - 2.2 1.4    Bilirubin, total      0.0 - 1.2 mg/dL 0.3    Alk.  phosphatase      39 - 117 IU/L 94    AST      0 - 40 IU/L 14    ALT (SGPT)      0 - 32 IU/L 12    Cholesterol, total      100 - 199 mg/dL  141   Triglyceride      0 - 149 mg/dL  130   HDL Cholesterol      >39 mg/dL  48   VLDL, calculated      5 - 40 mg/dL  26   LDL, calculated      0 - 99 mg/dL  67   Creatinine, urine      Not Estab. mg/dL     Microalbumin, urine      Not Estab. ug/mL     Microalbumin/Creat. Ratio      0.0 - 30.0 mg/g creat     Hemoglobin A1c, (calculated)      4.8 - 5.6 %     Estimated average glucose      mg/dL     TSH      0.450 - 4.500 uIU/mL     T4, Free      0.82 - 1.77 ng/dL         Assessment/Plan:   1) DM > Her A1C last week was 9.7%. She is taking her Glipizide 10mg BID with brunch and dinner and her Metformin 1000mg BID. Her FGBs are looking good, but she is snacking all day on sweets and starchy foods. We discussed starting her on something to help curb her appetite. Will try pt on Phentermine 15mg daily. She has no hx of arrythmia or any cardiac history. Her BP is well controlled. Pt to check her BG twice daily and mail her BG logs to me in 3 weeks. 2) Hypothyroidism > Pt is clinically and biochemically euthyroid on LT4 50mcg daily. Her TSH last week was 1.82 with FT4 of 1.46.     3) HTN > BP at goal on her current regimen. 4) HLD > Pt is on Rosuvastatin 10mg daily which she is tolerating well. Her Lipid panel in May 2019 was at goal.    Pt voices understanding and agreement with the plan. RTC 3 months      Follow-up and Dispositions    · Return in about 3 months (around 8/31/2019).          Copy sent to:  Dr. Duane Civatte

## 2019-10-04 RX ORDER — LANCETS
EACH MISCELLANEOUS
Qty: 200 EACH | Refills: 3 | Status: SHIPPED | OUTPATIENT
Start: 2019-10-04 | End: 2020-07-10

## 2019-10-04 RX ORDER — ISOPROPYL ALCOHOL 70 ML/100ML
SWAB TOPICAL
Qty: 200 PAD | Refills: 3 | Status: SHIPPED | OUTPATIENT
Start: 2019-10-04 | End: 2020-07-10

## 2019-10-04 RX ORDER — BLOOD-GLUCOSE METER
EACH MISCELLANEOUS
Qty: 1 EACH | Refills: 0 | Status: SHIPPED | OUTPATIENT
Start: 2019-10-04

## 2019-11-04 ENCOUNTER — OFFICE VISIT (OUTPATIENT)
Dept: ENDOCRINOLOGY | Age: 79
End: 2019-11-04

## 2019-11-04 VITALS
DIASTOLIC BLOOD PRESSURE: 86 MMHG | HEIGHT: 60 IN | BODY MASS INDEX: 28.98 KG/M2 | WEIGHT: 147.6 LBS | HEART RATE: 87 BPM | SYSTOLIC BLOOD PRESSURE: 127 MMHG

## 2019-11-04 DIAGNOSIS — I10 ESSENTIAL HYPERTENSION: ICD-10-CM

## 2019-11-04 DIAGNOSIS — E78.5 HYPERLIPIDEMIA, UNSPECIFIED HYPERLIPIDEMIA TYPE: ICD-10-CM

## 2019-11-04 DIAGNOSIS — E11.9 TYPE 2 DIABETES MELLITUS WITHOUT COMPLICATION, WITHOUT LONG-TERM CURRENT USE OF INSULIN (HCC): Primary | ICD-10-CM

## 2019-11-04 DIAGNOSIS — E03.9 HYPOTHYROIDISM (ACQUIRED): ICD-10-CM

## 2019-11-04 LAB — HBA1C MFR BLD HPLC: 6.8 %

## 2019-11-04 NOTE — PROGRESS NOTES
Chief Complaint   Patient presents with    Diabetes     pcp and pharmacy verified. Eye exam over a year ago.  Thyroid Problem   Records since last visit reviewed  History of Present Illness: Yehuda Bowen is a 70 y.o. female here for follow up of diabetes and hypothyroidism. She was diagnosed with diabetes around 2012. At our last visit in May 2019 her A1C was up to 9.7%. Pt reported that she had been snacking on a lot of sweets and was not physically active. Her FBGs were at goal, so we agreed to try phentermine 15mg daily to help with the hunger and snacking during the day. Her A1C today was 6.8%. Her weight today was 147, down 6 pounds. Pt notes she took the phentermine as needed and has since stopped taking it. Pt notes she has stopped the snacking and eating the sweets as frequently. She has not been very physically active, but does have the intention to do so. She is currently taking Pt is taking the Glipizide 10mg in the AM and 10mg with dinner and Metformin 1000mg with breakfast and 1000mg with dinner. Pt is eating 2 meals per day. She has brunch around 11AM-Noon, yesterday she had a baked potato, string beans and a hot dog. She will snack during the day. She has dinner around 6-7PM, last night she had a few cookies and no other food. She will snack in the evening as well. Pt goes to bed 11PM.    She notes she has not been walking much, because of the weather. No history of vascular disease. No history of neuropathy, or nephropathy. Last eye exam was December 2018, \"she told be my eyes looked good, no retinopathy. Will request these records. Pt has hx of cataracts removed in 2012. Pt was diagnosed with hypothyroidism in May 2015 by her PCP. In May 2019 her TSH was 1.82 with FT4 of 1.46 on LT4 50mcg daily. She denies issues of tremors, CP, SOB, heat or cold intolerance, diarrhea or constipation.     Her Lipid panel in May 2019 was at goal. She is still taking the Rosuvastatin 10mg daily. Current Outpatient Medications   Medication Sig    Blood Glucose Control High&Low (ACCU-CHEK NATE CONTROL SOLN) soln Use to calibrate glucometer. DX:E11.9    alcohol swabs (BD SINGLE USE SWABS REGULAR) padm Use prior to checking blood sugars twice a day. DX: E11.9    Blood-Glucose Meter (ACCU-CHEK NATE CONNECT METER) misc Use to check blood sugar twice a day. DX:E11.9    glucose blood VI test strips (ACCU-CHEK NATE PLUS TEST STRP) strip Use to check blood sugar twice a day. DX:E11.9    lancets (ACCU-CHEK SOFTCLIX LANCETS) misc Use to check blood sugar twice a day. DX:E11.9    hydroCHLOROthiazide (HYDRODIURIL) 12.5 mg tablet Take 12.5 mg by mouth daily.  metFORMIN ER (GLUCOPHAGE XR) 500 mg tablet TAKE 2 TABLETS BY MOUTH TWICE DAILY; TAKE 2 TABLETS BY MOUTH IN THE AM AND TAKE 2 TABLETS BY MOUTH IN THE PM    glipiZIDE (GLUCOTROL) 10 mg tablet TAKE 1 TABLET TWICE DAILY, WITH LUNCH AND DINNER    losartan (COZAAR) 50 mg tablet Take 1 Tab by mouth daily.  glucose blood VI test strips (ACCU-CHEK NATE) strip Check blood sugar twice a day. DX:E11.9    rosuvastatin (CRESTOR) 10 mg tablet nightly.  omeprazole (PRILOSEC) 20 mg capsule     amLODIPine (NORVASC) 10 mg tablet daily.  levothyroxine (SYNTHROID) 50 mcg tablet Take 50 mcg by mouth Daily (before breakfast).  aspirin delayed-release 81 mg tablet Take 81 mg by mouth daily. No current facility-administered medications for this visit. No Known Allergies  Review of Systems:  - Eyes: no blurry vision or double vision  - Cardiovascular: no chest pain  - Respiratory: no shortness of breath  - Musculoskeletal: no myalgias  - Neurological: no numbness/tingling in extremities    Physical Examination:  Blood pressure 127/86, pulse 87, height 5' (1.524 m), weight 147 lb 9.6 oz (67 kg).   - General: pleasant, no distress, good eye contact   - Neck: no carotid bruits  - Cardiovascular: regular, normal rate, nl s1 and s2, no m/r/g, 2+ DP pulses   - Respiratory: clear bilaterally  - Integumentary: no edema, no foot ulcers,   - Psychiatric: normal mood and affect    Diabetic foot exam:     Left Foot:   Visual Exam: normal    Pulse DP: 2+ (normal)   Filament test: normal sensation    Vibratory sensation: normal      Right Foot:   Visual Exam: normal    Pulse DP: 2+ (normal)   Filament test: normal sensation    Vibratory sensation: normal        Data Reviewed:   Her A1C today was 6.8%    Assessment/Plan:   1) DM > Her A1C today was 6.8%. Pt encouraged to keep up the excellent work and continue the Glipizide 10mg BID with brunch and dinner and her Metformin 1000mg BID. Pt to check her BG twice daily and mail her BG logs to me in 6 weeks. 2) Hypothyroidism > Pt is clinically euthyroid on LT4 50mcg daily. 3) HTN > BP at goal on her current regimen. 4) HLD > Pt is on Rosuvastatin 10mg daily which she is tolerating well. Her Lipid panel in May 2019 was at goal.    Pt voices understanding and agreement with the plan. RTC 3 months      Follow-up and Dispositions    · Return in about 3 months (around 2/4/2020).          Copy sent to:  Dr. Ashlie Butler

## 2020-01-03 RX ORDER — GLIPIZIDE 10 MG/1
TABLET ORAL
Qty: 60 TAB | Refills: 11 | Status: SHIPPED | OUTPATIENT
Start: 2020-01-03 | End: 2020-05-13 | Stop reason: SDUPTHER

## 2020-01-03 RX ORDER — METFORMIN HYDROCHLORIDE 500 MG/1
TABLET, EXTENDED RELEASE ORAL
Qty: 120 TAB | Refills: 11 | Status: SHIPPED | OUTPATIENT
Start: 2020-01-03 | End: 2020-05-11

## 2020-01-03 NOTE — TELEPHONE ENCOUNTER
Patient states that she wants ONLY a 30 day supply sent to her from [facility]. Advised will send new prescriptions for a 30 day supply of each with refills. Patient expressed gratitude. See refills pending.

## 2020-01-03 NOTE — TELEPHONE ENCOUNTER
PT is asking a return call @ [phone]. She wants to talk to you regarding her Rx metformin and Glipizide.

## 2020-02-04 DIAGNOSIS — E03.9 HYPOTHYROIDISM (ACQUIRED): ICD-10-CM

## 2020-02-04 DIAGNOSIS — E78.5 HYPERLIPIDEMIA, UNSPECIFIED HYPERLIPIDEMIA TYPE: ICD-10-CM

## 2020-02-04 DIAGNOSIS — I10 ESSENTIAL HYPERTENSION: ICD-10-CM

## 2020-02-04 DIAGNOSIS — E11.9 TYPE 2 DIABETES MELLITUS WITHOUT COMPLICATION, WITHOUT LONG-TERM CURRENT USE OF INSULIN (HCC): ICD-10-CM

## 2020-03-30 LAB
CREATININE, EXTERNAL: 1.01
HBA1C MFR BLD HPLC: 8.5 %
LDL-C, EXTERNAL: 71

## 2020-05-12 RX ORDER — METFORMIN HYDROCHLORIDE 500 MG/1
TABLET, EXTENDED RELEASE ORAL
Qty: 120 TAB | Refills: 1 | Status: SHIPPED | OUTPATIENT
Start: 2020-05-12 | End: 2020-05-12

## 2020-05-12 RX ORDER — METFORMIN HYDROCHLORIDE 500 MG/1
TABLET, EXTENDED RELEASE ORAL
Qty: 360 TAB | Refills: 3 | Status: SHIPPED | OUTPATIENT
Start: 2020-05-12 | End: 2021-06-25

## 2020-05-12 NOTE — TELEPHONE ENCOUNTER
----- Message from Dyan Muniz sent at 5/12/2020 11:14 AM EDT -----  Regarding: Dr. Adriano Garza first and last name:      Reason for call: Please contact [facility] pharmacy on file [phone] for refill Rx \"Metformin\" until her mail order Rx arrives. Pt is completely out.        Callback required yes/no and why: yes      Best contact number(s): [phone]      Details to clarify the request:      Dyan Muniz

## 2020-05-13 RX ORDER — GLIPIZIDE 10 MG/1
TABLET ORAL
Qty: 180 TAB | Refills: 3 | Status: SHIPPED | OUTPATIENT
Start: 2020-05-13 | End: 2020-05-14 | Stop reason: SDUPTHER

## 2020-05-14 RX ORDER — GLIPIZIDE 10 MG/1
TABLET ORAL
Qty: 180 TAB | Refills: 3 | Status: SHIPPED | OUTPATIENT
Start: 2020-05-14 | End: 2021-03-15 | Stop reason: SDUPTHER

## 2020-06-15 ENCOUNTER — VIRTUAL VISIT (OUTPATIENT)
Dept: ENDOCRINOLOGY | Age: 80
End: 2020-06-15

## 2020-06-15 DIAGNOSIS — E78.5 HYPERLIPIDEMIA, UNSPECIFIED HYPERLIPIDEMIA TYPE: ICD-10-CM

## 2020-06-15 DIAGNOSIS — E11.9 TYPE 2 DIABETES MELLITUS WITHOUT COMPLICATION, WITHOUT LONG-TERM CURRENT USE OF INSULIN (HCC): Primary | ICD-10-CM

## 2020-06-15 DIAGNOSIS — E03.9 HYPOTHYROIDISM (ACQUIRED): ICD-10-CM

## 2020-06-15 DIAGNOSIS — I10 ESSENTIAL HYPERTENSION WITH GOAL BLOOD PRESSURE LESS THAN 140/90: ICD-10-CM

## 2020-06-15 RX ORDER — ONDANSETRON 4 MG/1
4 TABLET, FILM COATED ORAL AS NEEDED
COMMUNITY
Start: 2020-05-20 | End: 2020-09-15 | Stop reason: SDUPTHER

## 2020-06-15 NOTE — PROGRESS NOTES
Chief Complaint   Patient presents with    Diabetes     PCP and Pharmacy verified   Records since last visit reviewed  History of Present Illness: Fabian Flower is a 68 y.o. female here for follow up of diabetes and hypothyroidism. She was diagnosed with diabetes around 2012. Her A1C in April was up to 8.5%, which was up from 6.8% in November 2020. Pt notes that in April 2020 she had a case of shingles. She notes that she did not call her PCP about this, but she notes that it resolved on its own, after 2 weeks. Pt is taking the Glipizide 10mg in the AM and 10mg with dinner and Metformin XR 1000mg with breakfast and 1000mg with dinner. She checks her BGs every day. She notes her BGs have been in the 120-170's. She notes that if she eats sweets the night before her BGs will be in the 160-170's. Pt notes she is eating two meals per day. She notes her AM meal is never at that the same time of day. Yesterday for magda she does not recall what she had. She reports that she is NOT snacking during the day, but will occasionally have a banana or regular soda. Her dinner is between 7-8PM, last night she had steak, beans, corn, broccoli, yams and iced tea (SNL). Pt notes her weight has been stable. No history of vascular disease. No history of neuropathy, or nephropathy. Last eye exam was January 2020, \"she told be my eyes looked good, no retinopathy. Will request these records. Pt has hx of cataracts removed in 2012. Pt was diagnosed with hypothyroidism in May 2015 by her PCP. In April 2020 her TSH was 2.53 on LT4 50mcg daily. She denies issues of tremors, CP, SOB, heat or cold intolerance, diarrhea or constipation. Her Lipid panel in April 2020 was at goal. She is still taking the Rosuvastatin 10mg daily. Current Outpatient Medications   Medication Sig    ondansetron hcl (ZOFRAN) 4 mg tablet Take 4 mg by mouth as needed.     glipiZIDE (GLUCOTROL) 10 mg tablet TAKE 1 TABLET TWICE DAILY, WITH LUNCH AND DINNER (30 day supply requested by patient)    metFORMIN ER (GLUCOPHAGE XR) 500 mg tablet TAKE 2 TABLETS BY MOUTH EVERY MORNING AND 2 TABLETS EVERY EVENING    losartan (COZAAR) 50 mg tablet TAKE 1 TABLET EVERY DAY    Blood Glucose Control High&Low (ACCU-CHEK NATE CONTROL SOLN) soln Use to calibrate glucometer. DX:E11.9    alcohol swabs (BD SINGLE USE SWABS REGULAR) padm Use prior to checking blood sugars twice a day. DX: E11.9    Blood-Glucose Meter (ACCU-CHEK NATE CONNECT METER) misc Use to check blood sugar twice a day. DX:E11.9    glucose blood VI test strips (ACCU-CHEK NATE PLUS TEST STRP) strip Use to check blood sugar twice a day. DX:E11.9    lancets (ACCU-CHEK SOFTCLIX LANCETS) misc Use to check blood sugar twice a day. DX:E11.9    hydroCHLOROthiazide (HYDRODIURIL) 12.5 mg tablet Take 12.5 mg by mouth daily.  glucose blood VI test strips (ACCU-CHEK NATE) strip Check blood sugar twice a day. DX:E11.9    rosuvastatin (CRESTOR) 10 mg tablet nightly.  omeprazole (PRILOSEC) 20 mg capsule     levothyroxine (SYNTHROID) 50 mcg tablet Take 50 mcg by mouth Daily (before breakfast).  aspirin delayed-release 81 mg tablet Take 81 mg by mouth daily. No current facility-administered medications for this visit. No Known Allergies  Review of Systems:  - Eyes: no blurry vision or double vision  - Cardiovascular: no chest pain  - Respiratory: no shortness of breath  - Musculoskeletal: no myalgias  - Neurological: no numbness/tingling in extremities    Physical Examination:  There were no vitals taken for this visit. None    Data Reviewed:   A1C 8.5%  TSH 2.53  LDL 71    Assessment/Plan:   1) DM > Her A1C has been higher, but it sounds like the issue is she is having something sweet in the evening and is drinking regular soda during the day. Pt to work on cutting out the snacking at night and the regular sodas during the day.   Pt to continue the Glipizide 10mg BID with brunch and dinner and her Metformin 1000mg BID. Pt to check her BG twice daily and mail her BG logs to me in 6 weeks. 2) Hypothyroidism > Pt is clinically and biochemically euthyroid on LT4 50mcg daily. 3) HTN > Pt is on an ARB for renal protection    4) HLD > Pt is on Rosuvastatin 10mg daily which she is tolerating well. Her Lipid panel in April 2020 was at goal.    Pt voices understanding and agreement with the plan. RTC 3 months    We spent 21 minutes of total time together during this virtual visit with a telephone encounter. All questions were answered and a copy of the instructions were sent to her via Ramen/a letter.          Copy sent to:  Dr. Toro Sensing

## 2020-09-15 ENCOUNTER — VIRTUAL VISIT (OUTPATIENT)
Dept: ENDOCRINOLOGY | Age: 80
End: 2020-09-15
Payer: MEDICARE

## 2020-09-15 DIAGNOSIS — E78.5 HYPERLIPIDEMIA, UNSPECIFIED HYPERLIPIDEMIA TYPE: ICD-10-CM

## 2020-09-15 DIAGNOSIS — E11.9 TYPE 2 DIABETES MELLITUS WITHOUT COMPLICATION, WITHOUT LONG-TERM CURRENT USE OF INSULIN (HCC): Primary | ICD-10-CM

## 2020-09-15 DIAGNOSIS — E03.9 HYPOTHYROIDISM (ACQUIRED): ICD-10-CM

## 2020-09-15 DIAGNOSIS — I10 ESSENTIAL HYPERTENSION: ICD-10-CM

## 2020-09-15 PROCEDURE — 99442 PR PHYS/QHP TELEPHONE EVALUATION 11-20 MIN: CPT | Performed by: INTERNAL MEDICINE

## 2020-09-15 RX ORDER — ONDANSETRON 4 MG/1
4 TABLET, FILM COATED ORAL
Qty: 30 TAB | Refills: 0 | Status: SHIPPED | OUTPATIENT
Start: 2020-09-15

## 2020-09-15 NOTE — PROGRESS NOTES
Chief Complaint   Patient presents with    Diabetes     pcp and pharmacy verified. TELEPHONE ONLY. eye exam UTD   Records since last visit reviewed          **THIS IS A VIRTUAL VISIT VIA A TELEPHONE ENCOUNTER. PATIENT AGREED TO HAVE THEIR CARE DELIVERED OVER THE PHONE IN PLACE OF THEIR REGULARLY SCHEDULED OFFICE VISIT**        History of Present Illness: Tristian Petty is a 68 y.o. female here for follow up of diabetes and hypothyroidism. She was diagnosed with diabetes around 2012. Her A1C in April was up to 8.5%, which was up from 6.8% in November 2020. Pt notes that in April 2020 she had a case of shingles. She notes that she did not call her PCP about this, but she notes that it resolved on its own, after 2 weeks. At our last visit in June 2020 pt notes she had been eating a lot of \"sweets\" which had caused her BGs to run higher. I counseled her on the importance of cutting out the sweets to help with BG control. Pt notes she has continued to have issues of shingles on her shoulder. Her PCP prescribed for her Gabapentin 300mg which she takes BID, PRN for the pain. Pt is taking the Glipizide 10mg in the AM and 10mg with dinner and Metformin XR 1000mg with breakfast and 1000mg with dinner. She checks her BGs every day. She notes her BGs have been \"150 or less since July\". Pt notes she has been \"craving cold watermelon or cantaloupe. Pt notes she is eating two meals per day. She notes her AM meal is never at that the same time of day. Yesterday for magda she does not recall what she had. She reports that she is NOT snacking during the day, but will occasionally have a banana or regular soda. Her dinner is between 7-8PM, last night she had a bologna sandwich, a roll and unsweetened iced tea and a sweet potato pie. Pt notes she has stopped eating the candy. Pt notes her weight has been stable. No history of vascular disease. No history of neuropathy, or nephropathy.       Last eye exam was January 2020, \"she told be my eyes looked good, no retinopathy. Will request these records. Pt has hx of cataracts removed in 2012. Pt was diagnosed with hypothyroidism in May 2015 by her PCP. In April 2020 her TSH was 2.53 on LT4 50mcg daily. She denies issues of tremors, CP, SOB, heat or cold intolerance, diarrhea or constipation. Her Lipid panel in April 2020 was at goal. She is still taking the Rosuvastatin 10mg daily. Current Outpatient Medications   Medication Sig    alcohol swabs (BD Single Use Swabs Regular) padm USE PRIOR TO CHECKING BLOOD SUGARS TWICE A DAY    lancets (Accu-Chek Softclix Lancets) misc USE TO CHECK BLOOD SUGAR TWICE DAILY    Accu-Chek Leti Plus test strp strip USE TO CHECK BLOOD SUGAR TWICE A DAY    ondansetron hcl (ZOFRAN) 4 mg tablet Take 4 mg by mouth as needed.  glipiZIDE (GLUCOTROL) 10 mg tablet TAKE 1 TABLET TWICE DAILY, WITH LUNCH AND DINNER (30 day supply requested by patient)    metFORMIN ER (GLUCOPHAGE XR) 500 mg tablet TAKE 2 TABLETS BY MOUTH EVERY MORNING AND 2 TABLETS EVERY EVENING    losartan (COZAAR) 50 mg tablet TAKE 1 TABLET EVERY DAY    Blood Glucose Control High&Low (ACCU-CHEK LETI CONTROL SOLN) soln Use to calibrate glucometer. DX:E11.9    Blood-Glucose Meter (ACCU-CHEK LETI CONNECT METER) misc Use to check blood sugar twice a day. DX:E11.9    glucose blood VI test strips (ACCU-CHEK LETI PLUS TEST STRP) strip Use to check blood sugar twice a day. DX:E11.9    hydroCHLOROthiazide (HYDRODIURIL) 12.5 mg tablet Take 12.5 mg by mouth daily.  glucose blood VI test strips (ACCU-CHEK LETI) strip Check blood sugar twice a day. DX:E11.9    rosuvastatin (CRESTOR) 10 mg tablet nightly.  omeprazole (PRILOSEC) 20 mg capsule Take 20 mg by mouth daily.  levothyroxine (SYNTHROID) 50 mcg tablet Take 50 mcg by mouth Daily (before breakfast).  aspirin delayed-release 81 mg tablet Take 81 mg by mouth daily.      No current facility-administered medications for this visit. No Known Allergies  Review of Systems:  - Eyes: no blurry vision or double vision  - Cardiovascular: no chest pain  - Respiratory: no shortness of breath  - Musculoskeletal: no myalgias  - Neurological: no numbness/tingling in extremities    Physical Examination:  There were no vitals taken for this visit. None    Data Reviewed:   none    Assessment/Plan:   1) DM > She reports her BGs have been improving since she stopped eating the candy during the day. Pt to work on cutting out the snacking at night and the regular sodas during the day. Pt to continue the Glipizide 10mg BID with brunch and dinner and her Metformin 1000mg BID. Pt to check her BG twice daily and mail her BG logs to me in 6 weeks. Will order an A1C.    2) Hypothyroidism > Pt is clinically  euthyroid on LT4 50mcg daily. Will order TFTs and adjust her dose as needed. 3) HTN > Pt is on an ARB for renal protection. Will not make any change at this time. Will order a urine MA. 4) HLD > Pt is on Rosuvastatin 10mg HS which she is tolerating well. Her Lipid panel in April 2020 was at goal.    Pt voices understanding and agreement with the plan. RTC 3 months    We spent 11 minutes of total time together during this virtual visit with a telephone encounter. All questions were answered and a copy of the instructions were sent to her via [employer]/a letter.          Copy sent to:  Dr. Michel Booth

## 2020-11-10 LAB
ALBUMIN SERPL-MCNC: 4.4 G/DL (ref 3.7–4.7)
ALBUMIN/CREAT UR: 7 MG/G CREAT (ref 0–29)
ALBUMIN/GLOB SERPL: 1.7 {RATIO} (ref 1.2–2.2)
ALP SERPL-CCNC: 87 IU/L (ref 39–117)
ALT SERPL-CCNC: 16 IU/L (ref 0–32)
AST SERPL-CCNC: 15 IU/L (ref 0–40)
BILIRUB SERPL-MCNC: 0.2 MG/DL (ref 0–1.2)
BUN SERPL-MCNC: 16 MG/DL (ref 8–27)
BUN/CREAT SERPL: 19 (ref 12–28)
CALCIUM SERPL-MCNC: 10.3 MG/DL (ref 8.7–10.3)
CHLORIDE SERPL-SCNC: 101 MMOL/L (ref 96–106)
CHOLEST SERPL-MCNC: 127 MG/DL (ref 100–199)
CO2 SERPL-SCNC: 24 MMOL/L (ref 20–29)
CREAT SERPL-MCNC: 0.85 MG/DL (ref 0.57–1)
CREAT UR-MCNC: 138 MG/DL
EST. AVERAGE GLUCOSE BLD GHB EST-MCNC: 232 MG/DL
GLOBULIN SER CALC-MCNC: 2.6 G/DL (ref 1.5–4.5)
GLUCOSE SERPL-MCNC: 193 MG/DL (ref 65–99)
HBA1C MFR BLD: 9.7 % (ref 4.8–5.6)
HDLC SERPL-MCNC: 46 MG/DL
INTERPRETATION, 910389: NORMAL
LDLC SERPL CALC-MCNC: 54 MG/DL (ref 0–99)
Lab: NORMAL
MICROALBUMIN UR-MCNC: 9.1 UG/ML
POTASSIUM SERPL-SCNC: 4.3 MMOL/L (ref 3.5–5.2)
PROT SERPL-MCNC: 7 G/DL (ref 6–8.5)
SODIUM SERPL-SCNC: 141 MMOL/L (ref 134–144)
T4 FREE SERPL-MCNC: 1.38 NG/DL (ref 0.82–1.77)
TRIGL SERPL-MCNC: 160 MG/DL (ref 0–149)
TSH SERPL DL<=0.005 MIU/L-ACNC: 4.04 UIU/ML (ref 0.45–4.5)
VLDLC SERPL CALC-MCNC: 27 MG/DL (ref 5–40)

## 2020-12-15 ENCOUNTER — VIRTUAL VISIT (OUTPATIENT)
Dept: ENDOCRINOLOGY | Age: 80
End: 2020-12-15
Payer: MEDICARE

## 2020-12-15 DIAGNOSIS — E11.9 TYPE 2 DIABETES MELLITUS WITHOUT COMPLICATION, WITHOUT LONG-TERM CURRENT USE OF INSULIN (HCC): Primary | ICD-10-CM

## 2020-12-15 DIAGNOSIS — E03.9 HYPOTHYROIDISM (ACQUIRED): ICD-10-CM

## 2020-12-15 DIAGNOSIS — E78.5 HYPERLIPIDEMIA, UNSPECIFIED HYPERLIPIDEMIA TYPE: ICD-10-CM

## 2020-12-15 DIAGNOSIS — I10 ESSENTIAL HYPERTENSION: ICD-10-CM

## 2020-12-15 PROCEDURE — 99442 PR PHYS/QHP TELEPHONE EVALUATION 11-20 MIN: CPT | Performed by: INTERNAL MEDICINE

## 2020-12-15 NOTE — PROGRESS NOTES
Chief Complaint   Patient presents with    Diabetes     pcp and pharmacy verified. Eye exam: Feb 2020. TELEPHONE CALL   Records since last visit reviewed          **THIS IS A VIRTUAL VISIT VIA A TELEPHONE ENCOUNTER. PATIENT AGREED TO HAVE THEIR CARE DELIVERED OVER THE PHONE IN PLACE OF THEIR REGULARLY SCHEDULED OFFICE VISIT**        History of Present Illness: Mingo Dalton is a 68 y.o. female here for follow up of diabetes and hypothyroidism. She was diagnosed with diabetes around 2012. Her A1C in April was up to 8.5%, which was up from 6.8% in November 2019. Pt notes that in May 2020 she had a case of shingles. She notes that she did not call her PCP about this, but she notes that it resolved on its own, after 2 weeks. Pt had her shingles vaccination in October 2020. She notes the pain and itching from the Shingles has resolved and she is no longer taking the Gabapentin. Pt notes that her BG in the morning have been in then 120-160s. She denies issues of hypoglycemia. Pt is taking the Glipizide 10mg in the AM and 10mg with dinner and Metformin XR 1000mg with breakfast and 1000mg with dinner. She notes that she had been eating a lot of baked potatoes and lot of fruit juices and katya-tess and lemonade. She notes that with the shingles getting better and the pain getting better she is feeling better. She notes she has not been as physically active as she used to be. Pt notes she is eating two meals per day. She notes her AM meal/Brunch is around 1-2PM, yesterday she had a pork chop, creamed potatoes, cabbage and peach juice. She reports that she is NOT snacking during the day, but will occasionally have a banana or fruit juice. Her dinner is between 7-8PM, last night she had seafood salad, a baked potato and peach juice. Pt notes her weight has been stable. No history of vascular disease. No history of neuropathy, or nephropathy.       Last eye exam was January 2020, \"she told be my eyes looked good, no retinopathy. Will request these records. Pt has hx of cataracts removed in 2012. Pt was diagnosed with hypothyroidism in May 2015 by her PCP. In November 2020 her TSH was 4.04 on LT4 50mcg daily. She denies issues of tremors, CP, SOB, heat or cold intolerance, diarrhea or constipation. Her Lipid panel in April 2020 was at goal. She is still taking the Rosuvastatin 10mg daily. Current Outpatient Medications   Medication Sig    ondansetron hcl (ZOFRAN) 4 mg tablet Take 1 Tab by mouth every twelve (12) hours as needed for Nausea.  alcohol swabs (BD Single Use Swabs Regular) padm USE PRIOR TO CHECKING BLOOD SUGARS TWICE A DAY    lancets (Accu-Chek Softclix Lancets) misc USE TO CHECK BLOOD SUGAR TWICE DAILY    Accu-Chek Leti Plus test strp strip USE TO CHECK BLOOD SUGAR TWICE A DAY    glipiZIDE (GLUCOTROL) 10 mg tablet TAKE 1 TABLET TWICE DAILY, WITH LUNCH AND DINNER (30 day supply requested by patient)    metFORMIN ER (GLUCOPHAGE XR) 500 mg tablet TAKE 2 TABLETS BY MOUTH EVERY MORNING AND 2 TABLETS EVERY EVENING    losartan (COZAAR) 50 mg tablet TAKE 1 TABLET EVERY DAY    Blood Glucose Control High&Low (ACCU-CHEK LETI CONTROL SOLN) soln Use to calibrate glucometer. DX:E11.9    Blood-Glucose Meter (ACCU-CHEK LETI CONNECT METER) misc Use to check blood sugar twice a day. DX:E11.9    glucose blood VI test strips (ACCU-CHEK LETI PLUS TEST STRP) strip Use to check blood sugar twice a day. DX:E11.9    hydroCHLOROthiazide (HYDRODIURIL) 12.5 mg tablet Take 12.5 mg by mouth daily.  glucose blood VI test strips (ACCU-CHEK LETI) strip Check blood sugar twice a day. DX:E11.9    rosuvastatin (CRESTOR) 10 mg tablet nightly.  omeprazole (PRILOSEC) 20 mg capsule Take 20 mg by mouth daily.  levothyroxine (SYNTHROID) 50 mcg tablet Take 50 mcg by mouth Daily (before breakfast).  aspirin delayed-release 81 mg tablet Take 81 mg by mouth daily.      No current facility-administered medications for this visit. No Known Allergies  Review of Systems:  - Eyes: no blurry vision or double vision  - Cardiovascular: no chest pain  - Respiratory: no shortness of breath  - Musculoskeletal: no myalgias  - Neurological: no numbness/tingling in extremities    Physical Examination:  There were no vitals taken for this visit. None    Data Reviewed:   Component      Latest Ref Rng & Units 11/9/2020 11/9/2020 11/9/2020 11/9/2020          11:42 AM 11:42 AM 11:42 AM 11:42 AM   Glucose      65 - 99 mg/dL   193 (H)    BUN      8 - 27 mg/dL   16    Creatinine      0.57 - 1.00 mg/dL   0.85    GFR est non-AA      >59 mL/min/1.73   66    GFR est AA      >59 mL/min/1.73   76    BUN/Creatinine ratio      12 - 28   19    Sodium      134 - 144 mmol/L   141    Potassium      3.5 - 5.2 mmol/L   4.3    Chloride      96 - 106 mmol/L   101    CO2      20 - 29 mmol/L   24    Calcium      8.7 - 10.3 mg/dL   10.3    Protein, total      6.0 - 8.5 g/dL   7.0    Albumin      3.7 - 4.7 g/dL   4.4    GLOBULIN, TOTAL      1.5 - 4.5 g/dL   2.6    A-G Ratio      1.2 - 2.2   1.7    Bilirubin, total      0.0 - 1.2 mg/dL   0.2    Alk. phosphatase      39 - 117 IU/L   87    AST      0 - 40 IU/L   15    ALT      0 - 32 IU/L   16    Cholesterol, total      100 - 199 mg/dL    127   Triglyceride      0 - 149 mg/dL    160 (H)   HDL Cholesterol      >39 mg/dL    46   VLDL Cholesterol John      5 - 40 mg/dL    27   LDL Cholesterol      0 - 99 mg/dL    54   TSH      0.450 - 4.500 uIU/mL  4.040     T4, Free      0.82 - 1.77 ng/dL 1.38        Component      Latest Ref Rng & Units 11/9/2020 11/9/2020          11:42 AM 11:42 AM   Creatinine, urine      Not Estab. mg/dL 138.0    Microalbumin, urine      Not Estab. ug/mL 9.1    Microalbumin/Creat.  Ratio      0 - 29 mg/g creat 7    Hemoglobin A1c, (calculated)      4.8 - 5.6 %  9.7 (H)   Estimated average glucose      mg/dL  232     Assessment/Plan:   1) DM > Her A1C in November was 9.7% which is up from 8.5% in April 2020. Pt instructed to stop drinking fruit juice and katya-aid and to stop eating the potatoes. I also instructed her to cut back on the amount of fruit she is eating during the day. Pt to continue the Glipizide 10mg BID with brunch and dinner and her Metformin 1000mg BID. Pt to check her BG twice daily and mail her BG logs to me in 6 weeks. 2) Hypothyroidism > Pt is clinically and biochemically euthyroid on LT4 50mcg daily. 3) HTN > Pt is on an ARB for renal protection. Will not make any change at this time. 4) HLD > Pt is on Rosuvastatin 10mg HS which she is tolerating well. Her Lipid panel in November 2020 was at goal.    Pt voices understanding and agreement with the plan. RTC 4 months    We spent 15 minutes of total time together during this virtual visit with a telephone encounter. All questions were answered and a copy of the instructions were sent to her via [employer]/a letter.          Copy sent to:  Dr. Tanisha Boggs

## 2021-03-15 DIAGNOSIS — E03.9 HYPOTHYROIDISM (ACQUIRED): ICD-10-CM

## 2021-03-15 DIAGNOSIS — E11.9 TYPE 2 DIABETES MELLITUS WITHOUT COMPLICATION, WITHOUT LONG-TERM CURRENT USE OF INSULIN (HCC): ICD-10-CM

## 2021-03-15 DIAGNOSIS — I10 ESSENTIAL HYPERTENSION: ICD-10-CM

## 2021-03-15 DIAGNOSIS — E78.5 HYPERLIPIDEMIA, UNSPECIFIED HYPERLIPIDEMIA TYPE: ICD-10-CM

## 2021-03-15 RX ORDER — GLIPIZIDE 10 MG/1
TABLET ORAL
Qty: 180 TAB | Refills: 0 | Status: SHIPPED | OUTPATIENT
Start: 2021-03-15 | End: 2021-06-11

## 2021-03-15 NOTE — TELEPHONE ENCOUNTER
Requested Prescriptions     Pending Prescriptions Disp Refills    glipiZIDE (GLUCOTROL) 10 mg tablet 180 Tab 3     Sig: TAKE 1 TABLET TWICE DAILY, WITH LUNCH AND DINNER

## 2021-04-14 ENCOUNTER — OFFICE VISIT (OUTPATIENT)
Dept: ENDOCRINOLOGY | Age: 81
End: 2021-04-14
Payer: MEDICARE

## 2021-04-14 VITALS
RESPIRATION RATE: 14 BRPM | DIASTOLIC BLOOD PRESSURE: 90 MMHG | BODY MASS INDEX: 28.71 KG/M2 | WEIGHT: 147 LBS | HEART RATE: 80 BPM | SYSTOLIC BLOOD PRESSURE: 155 MMHG

## 2021-04-14 DIAGNOSIS — E78.5 HYPERLIPIDEMIA, UNSPECIFIED HYPERLIPIDEMIA TYPE: ICD-10-CM

## 2021-04-14 DIAGNOSIS — I10 ESSENTIAL HYPERTENSION: ICD-10-CM

## 2021-04-14 DIAGNOSIS — E11.9 TYPE 2 DIABETES MELLITUS WITHOUT COMPLICATION, WITHOUT LONG-TERM CURRENT USE OF INSULIN (HCC): Primary | ICD-10-CM

## 2021-04-14 DIAGNOSIS — E03.9 HYPOTHYROIDISM (ACQUIRED): ICD-10-CM

## 2021-04-14 PROCEDURE — G8753 SYS BP > OR = 140: HCPCS | Performed by: INTERNAL MEDICINE

## 2021-04-14 PROCEDURE — G8419 CALC BMI OUT NRM PARAM NOF/U: HCPCS | Performed by: INTERNAL MEDICINE

## 2021-04-14 PROCEDURE — G8400 PT W/DXA NO RESULTS DOC: HCPCS | Performed by: INTERNAL MEDICINE

## 2021-04-14 PROCEDURE — G8755 DIAS BP > OR = 90: HCPCS | Performed by: INTERNAL MEDICINE

## 2021-04-14 PROCEDURE — G8427 DOCREV CUR MEDS BY ELIG CLIN: HCPCS | Performed by: INTERNAL MEDICINE

## 2021-04-14 PROCEDURE — 99214 OFFICE O/P EST MOD 30 MIN: CPT | Performed by: INTERNAL MEDICINE

## 2021-04-14 PROCEDURE — G8432 DEP SCR NOT DOC, RNG: HCPCS | Performed by: INTERNAL MEDICINE

## 2021-04-14 PROCEDURE — 1101F PT FALLS ASSESS-DOCD LE1/YR: CPT | Performed by: INTERNAL MEDICINE

## 2021-04-14 PROCEDURE — G8536 NO DOC ELDER MAL SCRN: HCPCS | Performed by: INTERNAL MEDICINE

## 2021-04-14 PROCEDURE — 1090F PRES/ABSN URINE INCON ASSESS: CPT | Performed by: INTERNAL MEDICINE

## 2021-04-14 NOTE — PROGRESS NOTES
Chief Complaint   Patient presents with    Diabetes     pcp and pharmacy verified. Eye exam: 2020  IN PERSON   Records since last visit reviewed              History of Present Illness: Yasmin Mitchell is a 68 y.o. female here for follow up of diabetes and hypothyroidism. She was diagnosed with diabetes around 2012. At our last visit in November 2020 her A1C was 9.7% which is up from 8.5% in April 2020. Pt instructed to stop drinking fruit juice and katya-aid and to stop eating the potatoes. I also instructed her to cut back on the amount of fruit she is eating during the day. Pt denies any illnesses, injuries or hospitalizations since our last visit. Pt has had both COVID vaccinations (LETSGROOP). Pt is taking the Glipizide 10mg in the AM and 10mg with dinner and Metformin XR 1000mg with breakfast and 1000mg with dinner. Pt has not been testing her BGs as she has been out of her test strips for a couple of weeks. She notes that from January to March 2021 her BGs were in the 120-200 range. She denies issues of hypoglycemia. \"I have been trying to lay off the fruit juices, but I have been craving them. I have cut down on the potatoes some but I have been eating them some. Pt is eating two meals per day. She does not eat breakfast.  Her first meal is around 1-2PM, yesterday she had 6\" cold cut sub, no side items and fruit juice  She has dinner around 7-8PM, last night she had two red potatoes with margarine, no side items and regular soda. She notes that in the evening she will snack on candy when she is watching TV. She notes that she had been eating a lot of baked potatoes and lot of fruit juices and katya-tess and lemonade. She notes that with the shingles getting better and the pain getting better she is feeling better. She notes she has not been as physically active as she used to be. Her weight today was 147 pounds. No history of vascular disease.  No history of neuropathy, or nephropathy. Last eye exam was January 2020, \"she told be my eyes looked good, no retinopathy. Pt has hx of cataracts removed in 2012. Pt was diagnosed with hypothyroidism in May 2015 by her PCP. In November 2020 her TSH was 4.04 on LT4 50mcg daily. She denies issues of tremors, CP, SOB, heat or cold intolerance, diarrhea or constipation. She is still taking her LT4 50mcg every day. Her Lipid panel in April 2020 was at goal. She is still taking the Rosuvastatin 10mg HS. Current Outpatient Medications   Medication Sig    glipiZIDE (GLUCOTROL) 10 mg tablet TAKE 1 TABLET TWICE DAILY, WITH LUNCH AND DINNER    ondansetron hcl (ZOFRAN) 4 mg tablet Take 1 Tab by mouth every twelve (12) hours as needed for Nausea.  alcohol swabs (BD Single Use Swabs Regular) padm USE PRIOR TO CHECKING BLOOD SUGARS TWICE A DAY    lancets (Accu-Chek Softclix Lancets) misc USE TO CHECK BLOOD SUGAR TWICE DAILY    Accu-Chek Leti Plus test strp strip USE TO CHECK BLOOD SUGAR TWICE A DAY    metFORMIN ER (GLUCOPHAGE XR) 500 mg tablet TAKE 2 TABLETS BY MOUTH EVERY MORNING AND 2 TABLETS EVERY EVENING    losartan (COZAAR) 50 mg tablet TAKE 1 TABLET EVERY DAY    Blood Glucose Control High&Low (ACCU-CHEK LETI CONTROL SOLN) soln Use to calibrate glucometer. DX:E11.9    Blood-Glucose Meter (ACCU-CHEK LETI CONNECT METER) misc Use to check blood sugar twice a day. DX:E11.9    glucose blood VI test strips (ACCU-CHEK LETI PLUS TEST STRP) strip Use to check blood sugar twice a day. DX:E11.9    hydroCHLOROthiazide (HYDRODIURIL) 12.5 mg tablet Take 12.5 mg by mouth daily.  glucose blood VI test strips (ACCU-CHEK LETI) strip Check blood sugar twice a day. DX:E11.9    rosuvastatin (CRESTOR) 10 mg tablet nightly.  omeprazole (PRILOSEC) 20 mg capsule Take 20 mg by mouth daily.  levothyroxine (SYNTHROID) 50 mcg tablet Take 50 mcg by mouth Daily (before breakfast).     aspirin delayed-release 81 mg tablet Take 81 mg by mouth daily. No current facility-administered medications for this visit. No Known Allergies  Review of Systems:  - Eyes: no blurry vision or double vision  - Cardiovascular: no chest pain  - Respiratory: no shortness of breath  - Musculoskeletal: no myalgias  - Neurological: no numbness/tingling in extremities    Physical Examination:  Blood pressure (!) 155/90, pulse 80, resp. rate 14, weight 147 lb (66.7 kg). - General: pleasant, no distress, good eye contact   - Neck: no carotid bruits  - Cardiovascular: regular, normal rate, nl s1 and s2, no m/r/g, 2+ DP pulses   - Respiratory: clear bilaterally  - Integumentary: no edema, no foot ulcers,   - Psychiatric: normal mood and affect    Diabetic foot exam:     Left Foot:   Visual Exam: normal    Pulse DP: 2+ (normal)   Filament test: normal sensation    Vibratory sensation: normal      Right Foot:   Visual Exam: normal    Pulse DP: 2+ (normal)   Filament test: normal sensation    Vibratory sensation: normal    Data Reviewed:   None    Assessment/Plan:   1) DM > Pt is still drinking her fruit juice and eating the potatoes. Will order an A1C and if it has not improved, we will need to discuss making some adjustments to her regimen. For now pt to continue the Glipizide 10mg BID with brunch and dinner and her Metformin 1000mg BID. Pt to check her BG twice daily and mail her BG logs to me in 6 weeks. 2) Hypothyroidism > Pt is clinically euthyroid on LT4 50mcg daily. Will order TFTs    3) HTN > Pt is on an ARB for renal protection. Her BP today was 155/90. When she tests at home her BPs are running in the 120/80. For now will not make any changes but will continue to monitor and if is remains high we may need to discuss making some adjustments. Will order a urine MA    4) HLD > Pt is on Rosuvastatin 10mg HS which she is tolerating well. Her Lipid panel in November 2020 was at goal.    Pt voices understanding and agreement with the plan.     RTC 4 months            Copy sent to:  Dr. Sondra Calle

## 2021-04-23 ENCOUNTER — TELEPHONE (OUTPATIENT)
Dept: ENDOCRINOLOGY | Age: 81
End: 2021-04-23

## 2021-04-23 NOTE — TELEPHONE ENCOUNTER
Spoke with the patient and she stated that last night her blood sugar was 58, this morning (6:30 am) it was 46; at am it was 41,and at pm it was 71. She stated that she feels fine and haven't had any symptoms of her blood sugar being low. She is concerned that she may be taking too many medications for her diabetes. She is currently taking Jardiance (1 tab daily of 10 mg), Glipizide ( 1 tab BID of 10 mg) and Metformin ER ( 2 tabs BID of the 500 mg). She stated that she would like to wait to hear from Dr. Padmini Raygoza once he returns to the office. She stated that in the meantime, she will continue to monitor her blood sugars and will report those numbers to Dr. Padmini Raygoza on Monday.

## 2021-04-23 NOTE — TELEPHONE ENCOUNTER
----- Message from [employer] sent at 4/23/2021  9:22 AM EDT -----  Regarding: Dr. Nael Whatley telephone  General Message/Vendor Calls    Caller's first and last name: n/a       Reason for call: blood levels      Callback required yes/no and why: yes       Best contact number(s):[phone]        Details to clarify the request: Patient stated her blood levels have been low with yesterday being 58 and today being 46. She would like to be contacted by Dr. Gilberto Rush or his nurse as she is concerned and stating she is not having many issues besides trembling hand when picking up an item.       [employer]

## 2021-04-27 NOTE — TELEPHONE ENCOUNTER
Spoke with pt regarding her BGs. Pt instructed to decrease her Glipizide to 5mg BID if she has anymore issues of low BGs. Pt voices understanding and agreement with the plan.

## 2021-06-25 RX ORDER — METFORMIN HYDROCHLORIDE 500 MG/1
TABLET, EXTENDED RELEASE ORAL
Qty: 360 TABLET | Refills: 0 | Status: SHIPPED | OUTPATIENT
Start: 2021-06-25 | End: 2021-06-26

## 2021-06-26 RX ORDER — METFORMIN HYDROCHLORIDE 500 MG/1
TABLET, EXTENDED RELEASE ORAL
Qty: 360 TABLET | Refills: 0 | Status: SHIPPED | OUTPATIENT
Start: 2021-06-26 | End: 2021-09-17

## 2021-07-26 ENCOUNTER — TELEPHONE (OUTPATIENT)
Dept: ENDOCRINOLOGY | Age: 81
End: 2021-07-26

## 2021-07-26 NOTE — TELEPHONE ENCOUNTER
----- Message from Vineet Goldman sent at 7/26/2021  2:17 PM EDT -----  Regarding: Dr. Scar Carnes: [phone]  General Message/Vendor Calls    Caller's first and last name:Pt      Reason for call:Pt is possibly having a UTI or bladder infection and would like a call back to discuss the burning sensation when the pt uses the restroom        Callback required yes/no and why:Yes, discuss.        Best contact number(s):[phone]      Details to clarify the request:n/a      Vineet Goldman

## 2021-08-18 ENCOUNTER — OFFICE VISIT (OUTPATIENT)
Dept: ENDOCRINOLOGY | Age: 81
End: 2021-08-18
Payer: MEDICARE

## 2021-08-18 VITALS
HEART RATE: 85 BPM | WEIGHT: 134 LBS | DIASTOLIC BLOOD PRESSURE: 88 MMHG | HEIGHT: 60 IN | BODY MASS INDEX: 26.31 KG/M2 | SYSTOLIC BLOOD PRESSURE: 125 MMHG

## 2021-08-18 DIAGNOSIS — I10 ESSENTIAL HYPERTENSION: ICD-10-CM

## 2021-08-18 DIAGNOSIS — E78.5 HYPERLIPIDEMIA, UNSPECIFIED HYPERLIPIDEMIA TYPE: ICD-10-CM

## 2021-08-18 DIAGNOSIS — E03.9 HYPOTHYROIDISM (ACQUIRED): ICD-10-CM

## 2021-08-18 DIAGNOSIS — E11.9 TYPE 2 DIABETES MELLITUS WITHOUT COMPLICATION, WITHOUT LONG-TERM CURRENT USE OF INSULIN (HCC): Primary | ICD-10-CM

## 2021-08-18 LAB — HBA1C MFR BLD HPLC: 6.3 %

## 2021-08-18 PROCEDURE — G8536 NO DOC ELDER MAL SCRN: HCPCS | Performed by: INTERNAL MEDICINE

## 2021-08-18 PROCEDURE — 83036 HEMOGLOBIN GLYCOSYLATED A1C: CPT | Performed by: INTERNAL MEDICINE

## 2021-08-18 PROCEDURE — G8427 DOCREV CUR MEDS BY ELIG CLIN: HCPCS | Performed by: INTERNAL MEDICINE

## 2021-08-18 PROCEDURE — 1090F PRES/ABSN URINE INCON ASSESS: CPT | Performed by: INTERNAL MEDICINE

## 2021-08-18 PROCEDURE — G8400 PT W/DXA NO RESULTS DOC: HCPCS | Performed by: INTERNAL MEDICINE

## 2021-08-18 PROCEDURE — 1101F PT FALLS ASSESS-DOCD LE1/YR: CPT | Performed by: INTERNAL MEDICINE

## 2021-08-18 PROCEDURE — 99214 OFFICE O/P EST MOD 30 MIN: CPT | Performed by: INTERNAL MEDICINE

## 2021-08-18 PROCEDURE — G8419 CALC BMI OUT NRM PARAM NOF/U: HCPCS | Performed by: INTERNAL MEDICINE

## 2021-08-18 PROCEDURE — G8432 DEP SCR NOT DOC, RNG: HCPCS | Performed by: INTERNAL MEDICINE

## 2021-08-18 PROCEDURE — G8754 DIAS BP LESS 90: HCPCS | Performed by: INTERNAL MEDICINE

## 2021-08-18 PROCEDURE — G8752 SYS BP LESS 140: HCPCS | Performed by: INTERNAL MEDICINE

## 2021-08-18 NOTE — PROGRESS NOTES
Chief Complaint   Patient presents with    Diabetes     pcp and pharmacy verified   Records since last visit reviewed              History of Present Illness: Edmundo Talley is a 80 y.o. female here for follow up of diabetes and hypothyroidism. She was diagnosed with diabetes around 2012. At our last visit in April 2021 her A1C was up to 10.4%. Pt instructed to stop drinking fruit juice and katya-aid and to stop eating the potatoes. I also instructed her to cut back on the amount of fruit she is eating during the day. I also started her on Jardiance 10mg daily. Her A1C today ws 6.3%. Pt is testing her BG daily and her BGs have been running in the 's. She has had some low BGs in the 50-60s. \"I quit eating pasta and potatoes, but I will still eat sandwiches so I do eat bread. I have been trying to eat like I am supposed to. \"  \"The jardiance seems to be working well and I have had some weight loss. \"    Her weight was down from 147 pounds to 134 pounds today. She had a UTI in July and I went to a urgent care doc and they gave me 8 days of Abx and I am feeling good. Pt has had both COVID vaccinations ([employer]). Pt is taking the Glipizide 10mg in the AM and 10mg with dinner and Metformin XR 1000mg with breakfast and 1000mg with dinner and Jardiance 10mg daily. \"I have been trying to lay off the fruit juices, but I have been craving them. I have cut out the soda and when I go to the grocery store I have get my cherries and bananas\". .    Pt is eating two meals per day. She does not eat breakfast.  Her first meal is around 1-2PM, yesterday she had shrimp salad, crackers and and fruit juice  She will occasionally have some cherries in the afternoon. She has dinner around 7-8PM, last night she had a hamburger on a stella bread, cherries and diet soda. She notes that in the evening she will snack on crackers. She notes she has not been as physically active as she used to be.     Her weight today was 134 pounds. No history of vascular disease. No history of neuropathy, or nephropathy. Last eye exam was July 2021, no retinopathy (report reviewed). Pt has hx of cataracts removed in 2012. Pt was diagnosed with hypothyroidism in May 2015 by her PCP. I  She denies issues of tremors, CP, SOB, heat or cold intolerance, diarrhea or constipation. She is still taking her LT4 50mcg every day. Her Lipid panel in April 2020 was at goal. She is still taking the Rosuvastatin 10mg HS. Current Outpatient Medications   Medication Sig    Jardiance 10 mg tablet TAKE 1 TABLET BY MOUTH DAILY    metFORMIN ER (GLUCOPHAGE XR) 500 mg tablet TAKE 2 TABLETS EVERY MORNING AND TAKE 2 TABLETS EVERY EVENING    glipiZIDE (GLUCOTROL) 10 mg tablet TAKE 1 TABLET TWICE DAILY (WITH LUNCH AND DINNER)    ondansetron hcl (ZOFRAN) 4 mg tablet Take 1 Tab by mouth every twelve (12) hours as needed for Nausea.  alcohol swabs (BD Single Use Swabs Regular) padm USE PRIOR TO CHECKING BLOOD SUGARS TWICE A DAY    lancets (Accu-Chek Softclix Lancets) misc USE TO CHECK BLOOD SUGAR TWICE DAILY    Accu-Chek Leti Plus test strp strip USE TO CHECK BLOOD SUGAR TWICE A DAY    losartan (COZAAR) 50 mg tablet TAKE 1 TABLET EVERY DAY    Blood Glucose Control High&Low (ACCU-CHEK LETI CONTROL SOLN) soln Use to calibrate glucometer. DX:E11.9    Blood-Glucose Meter (ACCU-CHEK LETI CONNECT METER) misc Use to check blood sugar twice a day. DX:E11.9    glucose blood VI test strips (ACCU-CHEK LETI PLUS TEST STRP) strip Use to check blood sugar twice a day. DX:E11.9    hydroCHLOROthiazide (HYDRODIURIL) 12.5 mg tablet Take 12.5 mg by mouth daily.  glucose blood VI test strips (ACCU-CHEK LETI) strip Check blood sugar twice a day. DX:E11.9    rosuvastatin (CRESTOR) 10 mg tablet nightly.  omeprazole (PRILOSEC) 20 mg capsule Take 20 mg by mouth daily.     levothyroxine (SYNTHROID) 50 mcg tablet Take 50 mcg by mouth Daily (before breakfast).  aspirin delayed-release 81 mg tablet Take 81 mg by mouth daily. No current facility-administered medications for this visit. No Known Allergies  Review of Systems:  - Eyes: no blurry vision or double vision  - Cardiovascular: no chest pain  - Respiratory: no shortness of breath  - Musculoskeletal: no myalgias  - Neurological: no numbness/tingling in extremities    Physical Examination:  Blood pressure 125/88, pulse 85, height 5' (1.524 m), weight 134 lb (60.8 kg). - General: pleasant, no distress, good eye contact   - Neck: no carotid bruits  - Cardiovascular: regular, normal rate, nl s1 and s2, no m/r/g, 2+ DP pulses   - Respiratory: clear bilaterally  - Integumentary: no edema, no foot ulcers,   - Psychiatric: normal mood and affect    Diabetic foot exam:     Left Foot:   Visual Exam: normal    Pulse DP: 2+ (normal)   Filament test: normal sensation    Vibratory sensation: normal      Right Foot:   Visual Exam: normal    Pulse DP: 2+ (normal)   Filament test: normal sensation    Vibratory sensation: normal        Data Reviewed:   Her A1C today was 6.3%    Assessment/Plan:   1) DM > Her A1C today was 6.3%, which is down from 10.4% in April 2021. Pt to continue the Jardiance 10mg every day, Metformin 1000mg BID, but will decrease her Glipizide to 5mg with brunch and 5mg with dinner. Pt to check her BG twice daily and mail her BG logs to me in 6 weeks. 2) Hypothyroidism > Pt is clinically euthyroid on LT4 50mcg daily. 3) HTN > Pt is on an ARB for renal protection. Her BP today was 125/88. Will not make any changes at this time. 4) HLD > Pt is on Rosuvastatin 10mg HS which she is tolerating well. Her Lipid panel in November 2020 was at goal.    Pt voices understanding and agreement with the plan.     RTC 4 months            Copy sent to:  Dr. Sukh Krause

## 2021-08-18 NOTE — LETTER
8/18/2021    Patient: Danial Huertas   YOB: 1940   Date of Visit: 8/18/2021     Tj Zurita MD  [id number] [id number]  Via Fax: [phone]    Dear Tj Zurita MD,      Thank you for referring Ms. Forest Norman to [address] [place] [address] for evaluation. My notes for this consultation are attached. If you have questions, please do not hesitate to call me. I look forward to following your patient along with you.       Sincerely,    Marlene Syed MD

## 2021-08-18 NOTE — PATIENT INSTRUCTIONS
1) I want you to cut your Glipizide pill in half and take 1/2 a pill with your brunch and 1/2 a pill with your dinner. 2) Continue the Metformin two pills in the morning and two pills with dinner. 3) Continue the Jardiance 10mg every day.

## 2021-10-18 RX ORDER — BLOOD SUGAR DIAGNOSTIC
STRIP MISCELLANEOUS
Qty: 200 STRIP | Refills: 3 | Status: SHIPPED | OUTPATIENT
Start: 2021-10-18 | End: 2021-12-21

## 2021-10-25 RX ORDER — GLIPIZIDE 10 MG/1
TABLET ORAL
Qty: 180 TABLET | Refills: 1 | Status: SHIPPED | OUTPATIENT
Start: 2021-10-25 | End: 2022-04-04

## 2021-11-27 RX ORDER — METFORMIN HYDROCHLORIDE 500 MG/1
TABLET, EXTENDED RELEASE ORAL
Qty: 360 TABLET | Refills: 3 | Status: SHIPPED | OUTPATIENT
Start: 2021-11-27 | End: 2022-09-07 | Stop reason: SDUPTHER

## 2021-12-01 ENCOUNTER — TELEPHONE (OUTPATIENT)
Dept: ENDOCRINOLOGY | Age: 81
End: 2021-12-01

## 2021-12-01 DIAGNOSIS — E11.9 TYPE 2 DIABETES MELLITUS WITHOUT COMPLICATION, WITHOUT LONG-TERM CURRENT USE OF INSULIN (HCC): ICD-10-CM

## 2021-12-01 DIAGNOSIS — I10 ESSENTIAL HYPERTENSION: ICD-10-CM

## 2021-12-01 DIAGNOSIS — E03.9 HYPOTHYROIDISM (ACQUIRED): ICD-10-CM

## 2021-12-01 DIAGNOSIS — E78.5 HYPERLIPIDEMIA, UNSPECIFIED HYPERLIPIDEMIA TYPE: ICD-10-CM

## 2021-12-01 NOTE — TELEPHONE ENCOUNTER
Patient called and she needs to refill her Reyna Cruise but has had a lapse in insurance for the month and cannot afford to pay out of pocket for it. Wants to know what to do in this situation. Contact number [phone].

## 2021-12-21 ENCOUNTER — OFFICE VISIT (OUTPATIENT)
Dept: ENDOCRINOLOGY | Age: 81
End: 2021-12-21
Payer: MEDICARE

## 2021-12-21 VITALS
HEART RATE: 86 BPM | WEIGHT: 133.2 LBS | BODY MASS INDEX: 26.15 KG/M2 | HEIGHT: 60 IN | SYSTOLIC BLOOD PRESSURE: 138 MMHG | DIASTOLIC BLOOD PRESSURE: 84 MMHG

## 2021-12-21 DIAGNOSIS — E78.5 HYPERLIPIDEMIA, UNSPECIFIED HYPERLIPIDEMIA TYPE: ICD-10-CM

## 2021-12-21 DIAGNOSIS — E11.9 TYPE 2 DIABETES MELLITUS WITHOUT COMPLICATION, WITHOUT LONG-TERM CURRENT USE OF INSULIN (HCC): Primary | ICD-10-CM

## 2021-12-21 DIAGNOSIS — I10 ESSENTIAL HYPERTENSION: ICD-10-CM

## 2021-12-21 DIAGNOSIS — E03.9 HYPOTHYROIDISM (ACQUIRED): ICD-10-CM

## 2021-12-21 LAB — HBA1C MFR BLD HPLC: 6.7 %

## 2021-12-21 PROCEDURE — 1090F PRES/ABSN URINE INCON ASSESS: CPT | Performed by: INTERNAL MEDICINE

## 2021-12-21 PROCEDURE — G8536 NO DOC ELDER MAL SCRN: HCPCS | Performed by: INTERNAL MEDICINE

## 2021-12-21 PROCEDURE — G8752 SYS BP LESS 140: HCPCS | Performed by: INTERNAL MEDICINE

## 2021-12-21 PROCEDURE — 83036 HEMOGLOBIN GLYCOSYLATED A1C: CPT | Performed by: INTERNAL MEDICINE

## 2021-12-21 PROCEDURE — 1101F PT FALLS ASSESS-DOCD LE1/YR: CPT | Performed by: INTERNAL MEDICINE

## 2021-12-21 PROCEDURE — 99214 OFFICE O/P EST MOD 30 MIN: CPT | Performed by: INTERNAL MEDICINE

## 2021-12-21 PROCEDURE — G8400 PT W/DXA NO RESULTS DOC: HCPCS | Performed by: INTERNAL MEDICINE

## 2021-12-21 PROCEDURE — G8419 CALC BMI OUT NRM PARAM NOF/U: HCPCS | Performed by: INTERNAL MEDICINE

## 2021-12-21 PROCEDURE — G8432 DEP SCR NOT DOC, RNG: HCPCS | Performed by: INTERNAL MEDICINE

## 2021-12-21 PROCEDURE — G8754 DIAS BP LESS 90: HCPCS | Performed by: INTERNAL MEDICINE

## 2021-12-21 PROCEDURE — G8427 DOCREV CUR MEDS BY ELIG CLIN: HCPCS | Performed by: INTERNAL MEDICINE

## 2021-12-21 NOTE — PROGRESS NOTES
Chief Complaint   Patient presents with    Diabetes     pcp and pharmacy verified   Records since last visit reviewed              History of Present Illness: Ronak Corbin is a 80 y.o. female here for follow up of diabetes and hypothyroidism. She was diagnosed with diabetes around 2012. At our last visit in April 2021 her A1C was up to 10.4%. Pt instructed to stop drinking fruit juice and katya-aid and to stop eating the potatoes. I also instructed her to cut back on the amount of fruit she is eating during the day. I also started her on Jardiance 10mg daily. Her A1C today ws 6.7%. Pt is testing her BG daily and her BGs have been running in the 's. \"I have been trying to eat like I am supposed to. \"    Her weight was down from 147 pounds to 133 pounds today. Pt has had both COVID vaccinations ([employer]). Pt is taking the Glipizide 5mg (1/2 of a 10mg pill) in the AM and 5mg with dinner and Metformin XR 1000mg with breakfast and 1000mg with dinner and Jardiance 10mg daily. She has been out of her losartan for 2 months. She notes her BPs have been in the 120-130s/70-80s. Pt is eating two meals per day. She does not eat breakfast.  Her first meal is Noonish, yesterday she had an english muffin, stewed apples (splenda), macario and coffee (SNL). She will occasionally have some cherries or grapes in the afternoon. She has dinner around 7-8PM, last night she had a pork chop, cabbage, potato salad and diet soda. She notes that in the evening she will snack on crackers. She notes she has not been as physically active as she used to be. No history of vascular disease. No history of neuropathy, or nephropathy. Last eye exam was July 2021, no retinopathy (report reviewed). Pt has hx of cataracts removed in 2012. Pt was diagnosed with hypothyroidism in May 2015 by her PCP. I  She denies issues of tremors, CP, SOB, heat or cold intolerance, diarrhea or constipation.   She is still taking her LT4 50mcg every day. Her Lipid panel in April 2020 was at goal. She is still taking the Rosuvastatin 10mg HS. Current Outpatient Medications   Medication Sig    empagliflozin (Jardiance) 10 mg tablet Take 1 Tablet by mouth daily.  metFORMIN ER (GLUCOPHAGE XR) 500 mg tablet TAKE 2 TABLETS EVERY MORNING AND TAKE 2 TABLETS EVERY EVENING    glipiZIDE (GLUCOTROL) 10 mg tablet TAKE 1 TABLET TWICE DAILY (WITH LUNCH AND DINNER) (Patient taking differently: 10 mg. TAKE 1/2 TABLET TWICE DAILY (WITH LUNCH AND DINNER))    ondansetron hcl (ZOFRAN) 4 mg tablet Take 1 Tab by mouth every twelve (12) hours as needed for Nausea.  alcohol swabs (BD Single Use Swabs Regular) padm USE PRIOR TO CHECKING BLOOD SUGARS TWICE A DAY    lancets (Accu-Chek Softclix Lancets) misc USE TO CHECK BLOOD SUGAR TWICE DAILY    Accu-Chek Leti Plus test strp strip USE TO CHECK BLOOD SUGAR TWICE A DAY    Blood Glucose Control High&Low (ACCU-CHEK LETI CONTROL SOLN) soln Use to calibrate glucometer. DX:E11.9    Blood-Glucose Meter (ACCU-CHEK LETI CONNECT METER) misc Use to check blood sugar twice a day. DX:E11.9    hydroCHLOROthiazide (HYDRODIURIL) 12.5 mg tablet Take 12.5 mg by mouth daily.  rosuvastatin (CRESTOR) 10 mg tablet nightly.  omeprazole (PRILOSEC) 20 mg capsule Take 20 mg by mouth daily.  levothyroxine (SYNTHROID) 50 mcg tablet Take 50 mcg by mouth Daily (before breakfast).  aspirin delayed-release 81 mg tablet Take 81 mg by mouth daily.  losartan (COZAAR) 50 mg tablet TAKE 1 TABLET EVERY DAY (Patient not taking: Reported on 12/21/2021)     No current facility-administered medications for this visit.      No Known Allergies  Review of Systems:  - Eyes: no blurry vision or double vision  - Cardiovascular: no chest pain  - Respiratory: no shortness of breath  - Musculoskeletal: no myalgias  - Neurological: no numbness/tingling in extremities    Physical Examination:  Blood pressure 138/84, pulse 86, height 5' (1.524 m), weight 133 lb 3.2 oz (60.4 kg). - General: pleasant, no distress, good eye contact   - Neck: no carotid bruits  - Cardiovascular: regular, normal rate, nl s1 and s2, no m/r/g, 2+ DP pulses   - Respiratory: clear bilaterally  - Integumentary: no edema, no foot ulcers,   - Psychiatric: normal mood and affect    Diabetic foot exam:     Left Foot:   Visual Exam: normal    Pulse DP: 2+ (normal)   Filament test: normal sensation    Vibratory sensation: normal      Right Foot:   Visual Exam: normal    Pulse DP: 2+ (normal)   Filament test: normal sensation    Vibratory sensation: normal        Data Reviewed:   Her A1C today was 6.7%    Assessment/Plan:   1) DM > Her A1C today was 6.7%. Pt to continue the Jardiance 10mg every day, Metformin 1000mg BID and Glipizide 5mg with brunch and 5mg with dinner. Pt to check her BG twice daily and mail her BG logs to me in 6 weeks. 2) Hypothyroidism > Pt is clinically euthyroid on LT4 50mcg daily. 3) HTN > Her BP today was 138/84. Will not make any changes at this time. 4) HLD > Pt is on Rosuvastatin 10mg HS which she is tolerating well. Her Lipid panel in November 2020 was at goal.    Pt voices understanding and agreement with the plan. RTC 6 months        Follow-up and Dispositions    · Return in about 6 months (around 6/21/2022).          Copy sent to:  Dr. Elana Rowland

## 2021-12-21 NOTE — LETTER
12/21/2021    Patient: Fabian Flower   YOB: 1940   Date of Visit: 12/21/2021     Ifrah Garcia MD  [address] [facility] [address]  Via Fax: [phone]    Dear Ifrah Garcia MD,      Thank you for referring Ms. Sallie Foster to [address] [place] [address] for evaluation. My notes for this consultation are attached. If you have questions, please do not hesitate to call me. I look forward to following your patient along with you.       Sincerely,    Alannah Ferrell MD

## 2022-01-05 NOTE — TELEPHONE ENCOUNTER
Telephone number in message is not in service. Number in demographics: [phone]. Left message on VM that Rx has been pended for approval. [place] [facility] [place]

## 2022-01-05 NOTE — TELEPHONE ENCOUNTER
1/5/2022   3:06 PM    Patient called at 10.00 on 01/04/22 and needs to see if dr Samuel Moy can call her in her jarvience to [facility]- please call her back at [phone] thanks

## 2022-03-18 PROBLEM — E78.5 HYPERLIPIDEMIA: Status: ACTIVE | Noted: 2017-01-27

## 2022-03-19 PROBLEM — E11.9 TYPE 2 DIABETES MELLITUS WITHOUT COMPLICATION, WITHOUT LONG-TERM CURRENT USE OF INSULIN (HCC): Status: ACTIVE | Noted: 2017-01-27

## 2022-04-07 RX ORDER — GLIPIZIDE 10 MG/1
TABLET ORAL
Qty: 90 TABLET | Refills: 2 | Status: SHIPPED | OUTPATIENT
Start: 2022-04-07

## 2022-05-25 ENCOUNTER — TELEPHONE (OUTPATIENT)
Dept: ENDOCRINOLOGY | Age: 82
End: 2022-05-25

## 2022-05-25 NOTE — TELEPHONE ENCOUNTER
5/25/2022  8:53 AM      Pt called and stated she have an appointment on June 1 pt would like to know how soon can she go and get her blood work done can she go today or tomorrow. [place]#[phone]      Thanks,  Jonel Patel

## 2022-05-25 NOTE — TELEPHONE ENCOUNTER
Advised patient that if she can go to [facility] tomorrow morning, fasting, that would allow enough time for  to receive the results by 06/01/2022. Patient expressed understanding.

## 2022-05-27 LAB
ALBUMIN SERPL-MCNC: 4.8 G/DL (ref 3.7–4.7)
ALBUMIN/CREAT UR: 4 MG/G CREAT (ref 0–29)
ALBUMIN/GLOB SERPL: 1.7 {RATIO} (ref 1.2–2.2)
ALP SERPL-CCNC: 71 IU/L (ref 44–121)
ALT SERPL-CCNC: 12 IU/L (ref 0–32)
AST SERPL-CCNC: 16 IU/L (ref 0–40)
BILIRUB SERPL-MCNC: 0.2 MG/DL (ref 0–1.2)
BUN SERPL-MCNC: 30 MG/DL (ref 8–27)
BUN/CREAT SERPL: 28 (ref 12–28)
CALCIUM SERPL-MCNC: 10.8 MG/DL (ref 8.7–10.3)
CHLORIDE SERPL-SCNC: 100 MMOL/L (ref 96–106)
CHOLEST SERPL-MCNC: 124 MG/DL (ref 100–199)
CO2 SERPL-SCNC: 20 MMOL/L (ref 20–29)
CREAT SERPL-MCNC: 1.09 MG/DL (ref 0.57–1)
CREAT UR-MCNC: 86.2 MG/DL
EGFR: 51 ML/MIN/1.73
EST. AVERAGE GLUCOSE BLD GHB EST-MCNC: 151 MG/DL
GLOBULIN SER CALC-MCNC: 2.8 G/DL (ref 1.5–4.5)
GLUCOSE SERPL-MCNC: 115 MG/DL (ref 65–99)
HBA1C MFR BLD: 6.9 % (ref 4.8–5.6)
HDLC SERPL-MCNC: 48 MG/DL
IMP & REVIEW OF LAB RESULTS: NORMAL
INTERPRETATION: NORMAL
LDLC SERPL CALC-MCNC: 56 MG/DL (ref 0–99)
MICROALBUMIN UR-MCNC: 3.2 UG/ML
POTASSIUM SERPL-SCNC: 4.3 MMOL/L (ref 3.5–5.2)
PROT SERPL-MCNC: 7.6 G/DL (ref 6–8.5)
SODIUM SERPL-SCNC: 141 MMOL/L (ref 134–144)
T4 FREE SERPL-MCNC: 1.31 NG/DL (ref 0.82–1.77)
TRIGL SERPL-MCNC: 112 MG/DL (ref 0–149)
TSH SERPL DL<=0.005 MIU/L-ACNC: 3.64 UIU/ML (ref 0.45–4.5)
VLDLC SERPL CALC-MCNC: 20 MG/DL (ref 5–40)

## 2022-06-01 ENCOUNTER — OFFICE VISIT (OUTPATIENT)
Dept: ENDOCRINOLOGY | Age: 82
End: 2022-06-01
Payer: MEDICARE

## 2022-06-01 VITALS
WEIGHT: 135.7 LBS | DIASTOLIC BLOOD PRESSURE: 78 MMHG | BODY MASS INDEX: 26.64 KG/M2 | HEART RATE: 94 BPM | SYSTOLIC BLOOD PRESSURE: 139 MMHG | HEIGHT: 60 IN

## 2022-06-01 DIAGNOSIS — E78.5 HYPERLIPIDEMIA, UNSPECIFIED HYPERLIPIDEMIA TYPE: ICD-10-CM

## 2022-06-01 DIAGNOSIS — E11.9 TYPE 2 DIABETES MELLITUS WITHOUT COMPLICATION, WITHOUT LONG-TERM CURRENT USE OF INSULIN (HCC): Primary | ICD-10-CM

## 2022-06-01 DIAGNOSIS — E03.9 HYPOTHYROIDISM (ACQUIRED): ICD-10-CM

## 2022-06-01 DIAGNOSIS — I10 ESSENTIAL HYPERTENSION: ICD-10-CM

## 2022-06-01 DIAGNOSIS — E11.9 TYPE 2 DIABETES MELLITUS WITHOUT COMPLICATION, WITHOUT LONG-TERM CURRENT USE OF INSULIN (HCC): ICD-10-CM

## 2022-06-01 PROCEDURE — 1123F ACP DISCUSS/DSCN MKR DOCD: CPT | Performed by: INTERNAL MEDICINE

## 2022-06-01 PROCEDURE — 1101F PT FALLS ASSESS-DOCD LE1/YR: CPT | Performed by: INTERNAL MEDICINE

## 2022-06-01 PROCEDURE — G8432 DEP SCR NOT DOC, RNG: HCPCS | Performed by: INTERNAL MEDICINE

## 2022-06-01 PROCEDURE — G8400 PT W/DXA NO RESULTS DOC: HCPCS | Performed by: INTERNAL MEDICINE

## 2022-06-01 PROCEDURE — 3044F HG A1C LEVEL LT 7.0%: CPT | Performed by: INTERNAL MEDICINE

## 2022-06-01 PROCEDURE — G8536 NO DOC ELDER MAL SCRN: HCPCS | Performed by: INTERNAL MEDICINE

## 2022-06-01 PROCEDURE — G8752 SYS BP LESS 140: HCPCS | Performed by: INTERNAL MEDICINE

## 2022-06-01 PROCEDURE — G8419 CALC BMI OUT NRM PARAM NOF/U: HCPCS | Performed by: INTERNAL MEDICINE

## 2022-06-01 PROCEDURE — 1090F PRES/ABSN URINE INCON ASSESS: CPT | Performed by: INTERNAL MEDICINE

## 2022-06-01 PROCEDURE — G8427 DOCREV CUR MEDS BY ELIG CLIN: HCPCS | Performed by: INTERNAL MEDICINE

## 2022-06-01 PROCEDURE — 99214 OFFICE O/P EST MOD 30 MIN: CPT | Performed by: INTERNAL MEDICINE

## 2022-06-01 PROCEDURE — G8754 DIAS BP LESS 90: HCPCS | Performed by: INTERNAL MEDICINE

## 2022-06-01 NOTE — LETTER
6/1/2022    Patient: Ty Queen   YOB: 1940   Date of Visit: 6/1/2022     Jay Kwko MD  [address] [address]  Via Fax: [phone]    Dear Jay Kwok MD,      Thank you for referring Ms. Jer Petty to [address] [place] [address] for evaluation. My notes for this consultation are attached. If you have questions, please do not hesitate to call me. I look forward to following your patient along with you.       Sincerely,    Leopold Ramming, MD

## 2022-06-01 NOTE — PROGRESS NOTES
Chief Complaint   Patient presents with    Diabetes     pcp and pharmacy verifieds   Records since last visit reviewed              History of Present Illness: Corazon Katz is a [de-identified] y.o. female here for follow up of diabetes and hypothyroidism. She was diagnosed with diabetes around 2012. Pt denies any recent illnesses, injuries or hospitalizations. Pt has had both COVID vaccinations ([employer]to Ibanez). Her A1C last week was 6.9%. Pt is testing her BG daily and her BFGs have been running in the 's. \"I have been trying to eat like I am supposed to. \"    Pt is taking the Glipizide 5mg (1/2 of a 10mg pill) in the AM and 5mg with dinner and Metformin XR 1000mg with breakfast and 1000mg with dinner and Jardiance 10mg daily. Pt is eating two meals per day. She does not eat breakfast.  Her first meal is Noonish, today she had 1/2 an Johann sub, no side items and diet soda. She will occasionally have some cherries or grapes in the afternoon. She has dinner around 630-830PM, last night she had a bowl of chili, no side items and diet soda. She notes that in the evening she will snack on crackers. No history of vascular disease. No history of neuropathy, or nephropathy. Last eye exam was March 2022, no retinopathy. Pt has hx of cataracts removed in 2012. Pt was diagnosed with hypothyroidism in May 2015 by her PCP. I  She denies issues of tremors, CP, SOB, heat or cold intolerance, diarrhea or constipation. She is still taking her LT4 50mcg every day. She is still taking the Rosuvastatin 10mg HS. Current Outpatient Medications   Medication Sig    empagliflozin (Jardiance) 10 mg tablet Take 1 Tablet by mouth daily.     glipiZIDE (GLUCOTROL) 10 mg tablet TAKE 1/2 TABLET TWICE DAILY (WITH LUNCH AND DINNER)    metFORMIN ER (GLUCOPHAGE XR) 500 mg tablet TAKE 2 TABLETS EVERY MORNING AND TAKE 2 TABLETS EVERY EVENING    ondansetron hcl (ZOFRAN) 4 mg tablet Take 1 Tab by mouth every twelve (12) hours as needed for Nausea.  alcohol swabs (BD Single Use Swabs Regular) padm USE PRIOR TO CHECKING BLOOD SUGARS TWICE A DAY    lancets (Accu-Chek Softclix Lancets) misc USE TO CHECK BLOOD SUGAR TWICE DAILY    Accu-Chek Leti Plus test strp strip USE TO CHECK BLOOD SUGAR TWICE A DAY    losartan (COZAAR) 50 mg tablet TAKE 1 TABLET EVERY DAY    Blood Glucose Control High&Low (ACCU-CHEK LETI CONTROL SOLN) soln Use to calibrate glucometer. DX:E11.9    Blood-Glucose Meter (ACCU-CHEK LETI CONNECT METER) misc Use to check blood sugar twice a day. DX:E11.9    hydroCHLOROthiazide (HYDRODIURIL) 12.5 mg tablet Take 12.5 mg by mouth daily.  rosuvastatin (CRESTOR) 10 mg tablet nightly.  omeprazole (PRILOSEC) 20 mg capsule Take 40 mg by mouth daily.  levothyroxine (SYNTHROID) 50 mcg tablet Take 50 mcg by mouth Daily (before breakfast).  aspirin delayed-release 81 mg tablet Take 81 mg by mouth daily. No current facility-administered medications for this visit. No Known Allergies  Review of Systems:  - Eyes: no blurry vision or double vision  - Cardiovascular: no chest pain  - Respiratory: no shortness of breath  - Musculoskeletal: no myalgias  - Neurological: no numbness/tingling in extremities    Physical Examination:  Blood pressure 139/78, pulse 94, height 5' (1.524 m), weight 135 lb 11.2 oz (61.6 kg).   - General: pleasant, no distress, good eye contact   - Neck: no carotid bruits  - Cardiovascular: regular, normal rate, nl s1 and s2, no m/r/g, 2+ DP pulses   - Respiratory: clear bilaterally  - Integumentary: no edema, no foot ulcers,   - Psychiatric: normal mood and affect    Diabetic foot exam:     Left Foot:   Visual Exam: normal    Pulse DP: 2+ (normal)   Filament test: normal sensation    Vibratory sensation: normal      Right Foot:   Visual Exam: normal    Pulse DP: 2+ (normal)   Filament test: normal sensation    Vibratory sensation: normal        Data Reviewed:   Component Latest Ref Rng & Units 5/26/2022   Glucose      65 - 99 mg/dL 115 (H)   BUN      8 - 27 mg/dL 30 (H)   Creatinine      0.57 - 1.00 mg/dL 1.09 (H)   eGFR      >59 mL/min/1.73 51 (L)   BUN/Creatinine ratio      12 - 28 28   Sodium      134 - 144 mmol/L 141   Potassium      3.5 - 5.2 mmol/L 4.3   Chloride      96 - 106 mmol/L 100   CO2      20 - 29 mmol/L 20   Calcium      8.7 - 10.3 mg/dL 10.8 (H)   Protein, total      6.0 - 8.5 g/dL 7.6   Albumin      3.7 - 4.7 g/dL 4.8 (H)   GLOBULIN, TOTAL      1.5 - 4.5 g/dL 2.8   A-G Ratio      1.2 - 2.2 1.7   Bilirubin, total      0.0 - 1.2 mg/dL 0.2   Alk. phosphatase      44 - 121 IU/L 71   AST      0 - 40 IU/L 16   ALT      0 - 32 IU/L 12   Cholesterol, total      100 - 199 mg/dL 124   Triglyceride      0 - 149 mg/dL 112   HDL Cholesterol      >39 mg/dL 48   VLDL, calculated      5 - 40 mg/dL 20   LDL, calculated      0 - 99 mg/dL 56   Creatinine, urine      Not Estab. mg/dL 86.2   Microalbumin, urine      Not Estab. ug/mL 3.2   Microalbumin/Creat. Ratio      0 - 29 mg/g creat 4   Hemoglobin A1c, (calculated)      4.8 - 5.6 % 6.9 (H)   Estimated average glucose      mg/dL 151   TSH      0.450 - 4.500 uIU/mL 3.640   T4, Free      0.82 - 1.77 ng/dL 1.31       Assessment/Plan:   1) DM > Her A1C last week was 6.9%. Pt to continue the Jardiance 10mg every day, Metformin 1000mg BID and Glipizide 5mg with brunch and 5mg with dinner. Pt to check her BG twice daily and mail her BG logs to me in 6 weeks. 2) Hypothyroidism > Pt is clinically and biochemically euthyroid on LT4 50mcg daily. Pt to continue this current regimen. 3) HTN > Her BP today was 139/78 on HCTZ 12.5mg daily, and Losartan 50mg daily. Will not make any changes at this time. 4) HLD > Pt is on Rosuvastatin 10mg HS which she is tolerating well. Her Lipid panel in May 2022 was at goal.    Pt voices understanding and agreement with the plan.     RTC 6 months        Follow-up and Dispositions    · Return in about 6 months (around 12/1/2022).          Copy sent to:  Dr. Tressa Wilburn

## 2022-06-29 ENCOUNTER — TELEPHONE (OUTPATIENT)
Dept: ENDOCRINOLOGY | Age: 82
End: 2022-06-29

## 2022-06-29 NOTE — TELEPHONE ENCOUNTER
Faxed application for PAP for Jardiance 6/28/22. Received a fax from [employer] stating they need proof of income from the patient. Gave patient the telephone number so that she can inquire what she needs to provide. Patient expressed understanding.

## 2022-07-06 ENCOUNTER — TELEPHONE (OUTPATIENT)
Dept: ENDOCRINOLOGY | Age: 82
End: 2022-07-06

## 2022-07-14 ENCOUNTER — DOCUMENTATION ONLY (OUTPATIENT)
Dept: ENDOCRINOLOGY | Age: 82
End: 2022-07-14

## 2022-07-15 ENCOUNTER — DOCUMENTATION ONLY (OUTPATIENT)
Dept: ENDOCRINOLOGY | Age: 82
End: 2022-07-15

## 2022-08-02 RX ORDER — BLOOD SUGAR DIAGNOSTIC
STRIP MISCELLANEOUS
Qty: 200 STRIP | Refills: 3 | Status: SHIPPED | OUTPATIENT
Start: 2022-08-02

## 2022-09-07 RX ORDER — METFORMIN HYDROCHLORIDE 500 MG/1
TABLET, EXTENDED RELEASE ORAL
Qty: 360 TABLET | Refills: 3 | Status: SHIPPED | OUTPATIENT
Start: 2022-09-07

## 2022-10-21 ENCOUNTER — TELEPHONE (OUTPATIENT)
Dept: ENDOCRINOLOGY | Age: 82
End: 2022-10-21

## 2022-10-21 NOTE — TELEPHONE ENCOUNTER
Pt called 10/21 @ 1:48 PM    Pt stated she has a form that needs to be filled out for her jardiance refill. She would like to know if she can bring it by today or Monday for dr Hans Dubin to fill out and then fax to the company. Pt would also like to know if dr Hans Dubin has the form she had him fill out back in January of this year in order to make sure the information matched on both forms.     Pt# [phone]

## 2022-10-21 NOTE — TELEPHONE ENCOUNTER
Advised patient to bring in the application and any documentation. Will fax when our portion is complete. Patient expressed understanding.

## 2022-12-02 ENCOUNTER — OFFICE VISIT (OUTPATIENT)
Dept: ENDOCRINOLOGY | Age: 82
End: 2022-12-02
Payer: MEDICARE

## 2022-12-02 VITALS
DIASTOLIC BLOOD PRESSURE: 82 MMHG | HEART RATE: 87 BPM | WEIGHT: 135.4 LBS | BODY MASS INDEX: 26.58 KG/M2 | SYSTOLIC BLOOD PRESSURE: 130 MMHG | HEIGHT: 60 IN

## 2022-12-02 DIAGNOSIS — I10 ESSENTIAL HYPERTENSION: ICD-10-CM

## 2022-12-02 DIAGNOSIS — E11.9 TYPE 2 DIABETES MELLITUS WITHOUT COMPLICATION, WITHOUT LONG-TERM CURRENT USE OF INSULIN (HCC): Primary | ICD-10-CM

## 2022-12-02 DIAGNOSIS — E03.9 HYPOTHYROIDISM (ACQUIRED): ICD-10-CM

## 2022-12-02 DIAGNOSIS — E78.5 HYPERLIPIDEMIA, UNSPECIFIED HYPERLIPIDEMIA TYPE: ICD-10-CM

## 2022-12-02 LAB — HBA1C MFR BLD HPLC: 6.9 %

## 2022-12-02 RX ORDER — OMEPRAZOLE 40 MG/1
CAPSULE, DELAYED RELEASE ORAL
COMMUNITY
Start: 2022-09-29

## 2022-12-02 NOTE — PROGRESS NOTES
Chief Complaint   Patient presents with    Diabetes     Pcp and pharmacy verified   Records since last visit reviewed              History of Present Illness: Kayley Tijerina is a [de-identified] y.o. female here for follow up of diabetes and hypothyroidism. She was diagnosed with diabetes around 2012. Pt denies any recent illnesses, injuries or hospitalizations. Her A1C today was 6.9%. Pt is testing her BG daily and her BFGs have been running in the 's. \"I have been trying to eat like I am supposed to. \"    Pt is taking the Glipizide 5mg (1/2 of a 10mg pill) in the AM and 5mg with dinner and Metformin XR 1000mg with breakfast and 1000mg with dinner and Jardiance 10mg daily. Pt is eating two meals per day. She does not eat breakfast.  Her first meal is Noonish, yesterday she had a hamburger, beans and diet soda. She will occasionally have some cherries or grapes in the afternoon. She has dinner around 6PM, last night she had [place] chicken with rice and diet soda. She notes that in the evening she will snack on crackers. No history of vascular disease. No history of neuropathy, or nephropathy. Last eye exam was March 2022, no retinopathy. Pt has hx of cataracts removed in 2012. Pt was diagnosed with hypothyroidism in May 2015 by her PCP. I  She denies issues of tremors, CP, SOB, heat or cold intolerance, diarrhea or constipation. She is still taking her LT4 50mcg every day. She is still taking the Rosuvastatin 10mg HS. Current Outpatient Medications   Medication Sig    omeprazole (PRILOSEC) 40 mg capsule     metFORMIN ER (GLUCOPHAGE XR) 500 mg tablet TAKE 2 TABLETS EVERY MORNING AND TAKE 2 TABLETS EVERY EVENING    Accu-Chek Leti Plus test strp strip TEST BLOOD SUGAR TWICE DAILY    empagliflozin (Jardiance) 10 mg tablet Take 1 Tablet by mouth daily.     glipiZIDE (GLUCOTROL) 10 mg tablet TAKE 1/2 TABLET TWICE DAILY (WITH LUNCH AND DINNER)    ondansetron hcl (ZOFRAN) 4 mg tablet Take 1 Tab by mouth every twelve (12) hours as needed for Nausea. alcohol swabs (BD Single Use Swabs Regular) padm USE PRIOR TO CHECKING BLOOD SUGARS TWICE A DAY    lancets (Accu-Chek Softclix Lancets) misc USE TO CHECK BLOOD SUGAR TWICE DAILY    losartan (COZAAR) 50 mg tablet TAKE 1 TABLET EVERY DAY    Blood Glucose Control High&Low (ACCU-CHEK NATE CONTROL SOLN) soln Use to calibrate glucometer. DX:E11.9    Blood-Glucose Meter (ACCU-CHEK NATE CONNECT METER) misc Use to check blood sugar twice a day. DX:E11.9    hydroCHLOROthiazide (HYDRODIURIL) 12.5 mg tablet Take 12.5 mg by mouth daily. rosuvastatin (CRESTOR) 10 mg tablet nightly. levothyroxine (SYNTHROID) 50 mcg tablet Take 50 mcg by mouth Daily (before breakfast). aspirin delayed-release 81 mg tablet Take 81 mg by mouth daily. omeprazole (PRILOSEC) 20 mg capsule Take 40 mg by mouth daily. (Patient not taking: Reported on 12/2/2022)     No current facility-administered medications for this visit. No Known Allergies  Review of Systems:  - Eyes: no blurry vision or double vision  - Cardiovascular: no chest pain  - Respiratory: no shortness of breath  - Musculoskeletal: no myalgias  - Neurological: no numbness/tingling in extremities    Physical Examination:  Blood pressure 130/82, pulse 87, height 5' (1.524 m), weight 135 lb 6.4 oz (61.4 kg).   General: pleasant, no distress, good eye contact   Neck: no carotid bruits  Cardiovascular: regular, normal rate, nl s1 and s2, no m/r/g, 2+ DP pulses   Respiratory: clear bilaterally  Integumentary: no edema, no foot ulcers,   Psychiatric: normal mood and affect    Diabetic foot exam:     Left Foot:   Visual Exam: normal    Pulse DP: 2+ (normal)   Filament test: normal sensation    Vibratory sensation: normal      Right Foot:   Visual Exam: normal    Pulse DP: 2+ (normal)   Filament test: normal sensation    Vibratory sensation: normal        Data Reviewed:   Her A1C today was 6.9%    Assessment/Plan: 1) DM > Her A1C today was 6.9%. Pt to continue the Jardiance 10mg every day, Metformin 1000mg BID and Glipizide 5mg with brunch and 5mg with dinner. Pt to check her BG daily. 2) Hypothyroidism > Pt is clinically euthyroid on LT4 50mcg daily. Pt to continue this current regimen. Her TFTs were at goal in May 2022. 3) HTN > Her BP today was 130/82 on HCTZ 12.5mg daily, and Losartan 50mg daily. Will not make any changes at this time. 4) HLD > Pt is on Rosuvastatin 10mg HS which she is tolerating well. Her Lipid panel in May 2022 was at goal.    Pt voices understanding and agreement with the plan.     RTC 6 months              Copy sent to:  Dr. Federico Kelley

## 2022-12-02 NOTE — LETTER
12/2/2022    Patient: Fanny Jimenez   YOB: 1940   Date of Visit: 12/2/2022     Khadra Logan MD  [id number] [address]  Via Fax: [phone]    Dear Khadra Logan MD,      Thank you for referring Ms. Alessandra Kruse to [address] [place] [address] for evaluation. My notes for this consultation are attached. If you have questions, please do not hesitate to call me. I look forward to following your patient along with you.       Sincerely,    Antonietta Mariscal MD

## 2022-12-21 RX ORDER — GLIPIZIDE 10 MG/1
TABLET ORAL
Qty: 90 TABLET | Refills: 2 | Status: SHIPPED | OUTPATIENT
Start: 2022-12-21

## 2022-12-28 ENCOUNTER — TELEPHONE (OUTPATIENT)
Dept: ENDOCRINOLOGY | Age: 82
End: 2022-12-28

## 2022-12-28 RX ORDER — GLIPIZIDE 10 MG/1
TABLET ORAL
Qty: 90 TABLET | Refills: 2
Start: 2022-12-28

## 2022-12-28 RX ORDER — METFORMIN HYDROCHLORIDE 500 MG/1
TABLET, EXTENDED RELEASE ORAL
Qty: 360 TABLET | Refills: 3
Start: 2022-12-28

## 2022-12-28 NOTE — TELEPHONE ENCOUNTER
Spoke with patient. She states that her blood sugars for the past few days have been in the 50s-60s at between 10-11 am.  She feels nauseated and sweaty when her sugars drop. She has been feeling nausea even without sugars dropping. She is taking Zofran, but not helping with nausea. She had diarrhea on 12/24/22. None since. She presently is taking glipizide 10 mg 1/2 tab twice a day, Metformin  mg 2 tabs BID and Jardiance. She has been drinking more water.

## 2022-12-28 NOTE — TELEPHONE ENCOUNTER
12/28/2022  1:18 PM    Pt daughter, walt sung, called to speak with dr Saige Edmond or nurse about her low blood sugar. She said its currently at 79 and her mom is lethargic and sick  to her stomach.  She would like a call back at [phone]

## 2022-12-28 NOTE — TELEPHONE ENCOUNTER
Have her stop the morning dose of glipizide and just take 1/2 tab before dinner only. She can also decrease her metformin to 1 tab in the morning and 2 tabs at night to see if this helps with the nausea. She should stay on the same dose of jardiance 1 tab daily. Give Dr. Leah Good an update next week with how her nausea and blood sugars are doing with these changes.

## 2022-12-28 NOTE — TELEPHONE ENCOUNTER
Patient has been advised. She states that since speaking with me today, she is feeling better. Thinks may have been the foods that she does not normally eat. She will see how she feels. If her blood sugars are still low, she will make these adjustments. She states will call next week with an update.

## 2023-01-12 ENCOUNTER — TELEPHONE (OUTPATIENT)
Dept: ENDOCRINOLOGY | Age: 83
End: 2023-01-12

## 2023-01-12 NOTE — TELEPHONE ENCOUNTER
1/12/2023  10:31 AM    Pt called to let dr Kimberly Iniguez know that she is now back on her original  dose of her medications after cutting back due to low blood sugar levels.  She wants him to know that she is feeling much better- please call her back before 1.00 at [phone]

## 2023-04-22 DIAGNOSIS — E03.9 HYPOTHYROIDISM (ACQUIRED): Primary | ICD-10-CM

## 2023-04-23 DIAGNOSIS — E78.5 HYPERLIPIDEMIA, UNSPECIFIED HYPERLIPIDEMIA TYPE: ICD-10-CM

## 2023-04-23 DIAGNOSIS — E11.9 TYPE 2 DIABETES MELLITUS WITHOUT COMPLICATION, WITHOUT LONG-TERM CURRENT USE OF INSULIN (HCC): Primary | ICD-10-CM

## 2023-04-24 DIAGNOSIS — E03.9 HYPOTHYROIDISM (ACQUIRED): Primary | ICD-10-CM

## 2023-06-01 DIAGNOSIS — I10 ESSENTIAL HYPERTENSION: ICD-10-CM

## 2023-06-01 DIAGNOSIS — E03.9 HYPOTHYROIDISM (ACQUIRED): ICD-10-CM

## 2023-06-01 DIAGNOSIS — E78.5 HYPERLIPIDEMIA, UNSPECIFIED HYPERLIPIDEMIA TYPE: ICD-10-CM

## 2023-06-01 DIAGNOSIS — E11.9 TYPE 2 DIABETES MELLITUS WITHOUT COMPLICATION, WITHOUT LONG-TERM CURRENT USE OF INSULIN (HCC): ICD-10-CM

## 2023-06-01 LAB
ALBUMIN SERPL-MCNC: 4.4 G/DL (ref 3.6–4.6)
ALBUMIN/CREAT UR: 26 MG/G CREAT (ref 0–29)
ALBUMIN/GLOB SERPL: 1.3 {RATIO} (ref 1.2–2.2)
ALP SERPL-CCNC: 96 IU/L (ref 44–121)
ALT SERPL-CCNC: 15 IU/L (ref 0–32)
AST SERPL-CCNC: 17 IU/L (ref 0–40)
BILIRUB SERPL-MCNC: 0.2 MG/DL (ref 0–1.2)
BUN SERPL-MCNC: 26 MG/DL (ref 8–27)
BUN/CREAT SERPL: 20 (ref 12–28)
CALCIUM SERPL-MCNC: 10.6 MG/DL (ref 8.7–10.3)
CHLORIDE SERPL-SCNC: 100 MMOL/L (ref 96–106)
CHOLEST SERPL-MCNC: 133 MG/DL (ref 100–199)
CO2 SERPL-SCNC: 17 MMOL/L (ref 20–29)
CREAT SERPL-MCNC: 1.27 MG/DL (ref 0.57–1)
CREAT UR-MCNC: 97.7 MG/DL
EGFRCR SERPLBLD CKD-EPI 2021: 42 ML/MIN/1.73
EST. AVERAGE GLUCOSE BLD GHB EST-MCNC: 154 MG/DL
GLOBULIN SER CALC-MCNC: 3.3 G/DL (ref 1.5–4.5)
GLUCOSE SERPL-MCNC: 145 MG/DL (ref 70–99)
HBA1C MFR BLD: 7 % (ref 4.8–5.6)
HDLC SERPL-MCNC: 51 MG/DL
IMP & REVIEW OF LAB RESULTS: NORMAL
LDLC SERPL CALC-MCNC: 62 MG/DL (ref 0–99)
MICROALBUMIN UR-MCNC: 25.4 UG/ML
POTASSIUM SERPL-SCNC: 5.4 MMOL/L (ref 3.5–5.2)
PROT SERPL-MCNC: 7.7 G/DL (ref 6–8.5)
REPORT: NORMAL
SODIUM SERPL-SCNC: 139 MMOL/L (ref 134–144)
T4 FREE SERPL-MCNC: 1.36 NG/DL (ref 0.82–1.77)
TRIGL SERPL-MCNC: 109 MG/DL (ref 0–149)
TSH SERPL DL<=0.005 MIU/L-ACNC: 4.22 UIU/ML (ref 0.45–4.5)
VLDLC SERPL CALC-MCNC: 20 MG/DL (ref 5–40)

## 2023-06-02 ENCOUNTER — OFFICE VISIT (OUTPATIENT)
Age: 83
End: 2023-06-02
Payer: MEDICARE

## 2023-06-02 VITALS
BODY MASS INDEX: 25.48 KG/M2 | WEIGHT: 129.8 LBS | SYSTOLIC BLOOD PRESSURE: 111 MMHG | DIASTOLIC BLOOD PRESSURE: 72 MMHG | HEIGHT: 60 IN | HEART RATE: 78 BPM

## 2023-06-02 DIAGNOSIS — E03.9 ACQUIRED HYPOTHYROIDISM: ICD-10-CM

## 2023-06-02 DIAGNOSIS — I10 ESSENTIAL (PRIMARY) HYPERTENSION: ICD-10-CM

## 2023-06-02 DIAGNOSIS — E11.9 TYPE 2 DIABETES MELLITUS WITHOUT COMPLICATION, WITHOUT LONG-TERM CURRENT USE OF INSULIN (HCC): Primary | ICD-10-CM

## 2023-06-02 DIAGNOSIS — E78.2 MIXED HYPERLIPIDEMIA: ICD-10-CM

## 2023-06-02 PROCEDURE — 1123F ACP DISCUSS/DSCN MKR DOCD: CPT | Performed by: INTERNAL MEDICINE

## 2023-06-02 PROCEDURE — 3051F HG A1C>EQUAL 7.0%<8.0%: CPT | Performed by: INTERNAL MEDICINE

## 2023-06-02 PROCEDURE — 99214 OFFICE O/P EST MOD 30 MIN: CPT | Performed by: INTERNAL MEDICINE

## 2023-06-02 PROCEDURE — 3074F SYST BP LT 130 MM HG: CPT | Performed by: INTERNAL MEDICINE

## 2023-06-02 PROCEDURE — G8419 CALC BMI OUT NRM PARAM NOF/U: HCPCS | Performed by: INTERNAL MEDICINE

## 2023-06-02 PROCEDURE — G8427 DOCREV CUR MEDS BY ELIG CLIN: HCPCS | Performed by: INTERNAL MEDICINE

## 2023-06-02 PROCEDURE — G8400 PT W/DXA NO RESULTS DOC: HCPCS | Performed by: INTERNAL MEDICINE

## 2023-06-02 PROCEDURE — 1036F TOBACCO NON-USER: CPT | Performed by: INTERNAL MEDICINE

## 2023-06-02 PROCEDURE — 1090F PRES/ABSN URINE INCON ASSESS: CPT | Performed by: INTERNAL MEDICINE

## 2023-06-02 PROCEDURE — 3078F DIAST BP <80 MM HG: CPT | Performed by: INTERNAL MEDICINE

## 2023-06-02 RX ORDER — GLIPIZIDE 10 MG/1
TABLET ORAL
Qty: 90 TABLET | Refills: 3
Start: 2023-06-02

## 2023-06-02 NOTE — PROGRESS NOTES
Chief Complaint   Patient presents with    Diabetes     PCP and pharmacy confirmed      History of Present Illness: Aditya Krishnan is a 82 y.o. female here for follow up of diabetes and hypothyroidism. She was diagnosed with diabetes around 2012. Pt denies any recent illnesses, injuries or hospitalizations. \"I have been trying to eat like I am supposed to. \"    Pt is taking the Glipizide 5mg (1/2 of a 10mg pill) with brunch and 5mg with dinner and Metformin XR 1000mg with breakfast and 1000mg with dinner and Jardiance 10mg daily. Pt is testing her BG every morning. Her FBGs have been running in the 70-150s. Her A1C last week was 7.0%    Pt is eating two meals per day. She does not eat breakfast.  - Her first meal is Noonish, she does not recall what she has for brunch yesterday. - She will occasionally have some cherries or grapes in the afternoon.  - She has dinner around 6PM, last night she had a tossed salad and diet ginger ale. - She notes that in the evening she will snack on crackers. No history of vascular disease. No history of neuropathy, or nephropathy. Last eye exam was April 2023, no retinopathy. \"She said my eyes were looking good. \"  Pt has hx of cataracts removed in 2012. Pt was diagnosed with hypothyroidism in May 2015. She denies issues of tremors, CP, SOB, heat or cold intolerance, diarrhea or constipation. She is still taking her LT4 50mcg every day. She is still taking the Rosuvastatin 10mg HS.     Current Outpatient Medications   Medication Sig    aspirin 81 MG EC tablet Take 1 tablet by mouth daily    empagliflozin (JARDIANCE) 10 MG tablet Take 1 tablet by mouth daily    glipiZIDE (GLUCOTROL) 10 MG tablet TAKE 1/2 TABLET BEFORE DINNER--Dose change 12/28/22--updated med list--did not send prescription to the pharmacy    hydroCHLOROthiazide (HYDRODIURIL) 12.5 MG tablet Take 1 tablet by mouth daily    levothyroxine (SYNTHROID) 50 MCG tablet Take 1 tablet by

## 2023-07-25 RX ORDER — METFORMIN HYDROCHLORIDE 500 MG/1
TABLET, EXTENDED RELEASE ORAL
Qty: 360 TABLET | Refills: 3 | Status: SHIPPED | OUTPATIENT
Start: 2023-07-25

## 2023-10-11 ENCOUNTER — TELEPHONE (OUTPATIENT)
Age: 83
End: 2023-10-11

## 2023-10-11 NOTE — TELEPHONE ENCOUNTER
Faxed application to [employer] on 10/09/23. Received a fax stating needs Proof of Income. Patient states will bring in the documentation to fax.

## 2023-10-12 ENCOUNTER — TELEPHONE (OUTPATIENT)
Age: 83
End: 2023-10-12

## 2023-10-12 NOTE — TELEPHONE ENCOUNTER
Patient brought in financial documents, but only one with a current date (Social security document is from 2023. All other documents are from 2020. Advised patient that more recent documents are needed from 2022 or 2023. Patient states that she will bring the documents that she has tomorrow to send to [facility].

## 2023-10-12 NOTE — TELEPHONE ENCOUNTER
10/12/2023    Pt called and left a vm at 12:55 pm stating she would like to speak with Dr. Nawaf Bailey nurse. No additional information left on vm. Pt can be reached at [phone].     Thanks, Katerina Gonzalez

## 2023-11-06 RX ORDER — GLIPIZIDE 10 MG/1
TABLET ORAL
Qty: 90 TABLET | Refills: 1 | Status: SHIPPED | OUTPATIENT
Start: 2023-11-06

## 2023-11-28 LAB
ALBUMIN SERPL-MCNC: 4.4 G/DL (ref 3.7–4.7)
ALBUMIN/GLOB SERPL: 1.6 {RATIO} (ref 1.2–2.2)
ALP SERPL-CCNC: 70 IU/L (ref 44–121)
ALT SERPL-CCNC: 12 IU/L (ref 0–32)
AST SERPL-CCNC: 12 IU/L (ref 0–40)
BILIRUB SERPL-MCNC: 0.3 MG/DL (ref 0–1.2)
BUN SERPL-MCNC: 21 MG/DL (ref 8–27)
BUN/CREAT SERPL: 22 (ref 12–28)
CALCIUM SERPL-MCNC: 10.5 MG/DL (ref 8.7–10.3)
CHLORIDE SERPL-SCNC: 104 MMOL/L (ref 96–106)
CHOLEST SERPL-MCNC: 137 MG/DL (ref 100–199)
CO2 SERPL-SCNC: 23 MMOL/L (ref 20–29)
CREAT SERPL-MCNC: 0.97 MG/DL (ref 0.57–1)
EGFRCR SERPLBLD CKD-EPI 2021: 59 ML/MIN/1.73
GLOBULIN SER CALC-MCNC: 2.7 G/DL (ref 1.5–4.5)
GLUCOSE SERPL-MCNC: 77 MG/DL (ref 70–99)
HBA1C MFR BLD: 7 % (ref 4.8–5.6)
HDLC SERPL-MCNC: 51 MG/DL
LDLC SERPL CALC-MCNC: 66 MG/DL (ref 0–99)
POTASSIUM SERPL-SCNC: 4.7 MMOL/L (ref 3.5–5.2)
PROT SERPL-MCNC: 7.1 G/DL (ref 6–8.5)
SODIUM SERPL-SCNC: 142 MMOL/L (ref 134–144)
T4 FREE SERPL-MCNC: 1.51 NG/DL (ref 0.82–1.77)
TRIGL SERPL-MCNC: 110 MG/DL (ref 0–149)
TSH SERPL DL<=0.005 MIU/L-ACNC: 3.01 UIU/ML (ref 0.45–4.5)
VLDLC SERPL CALC-MCNC: 20 MG/DL (ref 5–40)

## 2023-11-29 LAB
IMP & REVIEW OF LAB RESULTS: NORMAL
Lab: NORMAL
REPORT: NORMAL
REPORT: NORMAL

## 2023-12-01 DIAGNOSIS — I10 ESSENTIAL (PRIMARY) HYPERTENSION: ICD-10-CM

## 2023-12-01 DIAGNOSIS — E03.9 ACQUIRED HYPOTHYROIDISM: ICD-10-CM

## 2023-12-01 DIAGNOSIS — E11.9 TYPE 2 DIABETES MELLITUS WITHOUT COMPLICATION, WITHOUT LONG-TERM CURRENT USE OF INSULIN (HCC): ICD-10-CM

## 2023-12-01 DIAGNOSIS — E78.2 MIXED HYPERLIPIDEMIA: ICD-10-CM

## 2023-12-04 ENCOUNTER — OFFICE VISIT (OUTPATIENT)
Age: 83
End: 2023-12-04
Payer: MEDICARE

## 2023-12-04 VITALS
HEART RATE: 78 BPM | WEIGHT: 133.6 LBS | BODY MASS INDEX: 26.23 KG/M2 | SYSTOLIC BLOOD PRESSURE: 130 MMHG | DIASTOLIC BLOOD PRESSURE: 74 MMHG | HEIGHT: 60 IN

## 2023-12-04 DIAGNOSIS — I10 ESSENTIAL (PRIMARY) HYPERTENSION: ICD-10-CM

## 2023-12-04 DIAGNOSIS — E03.9 ACQUIRED HYPOTHYROIDISM: ICD-10-CM

## 2023-12-04 DIAGNOSIS — E11.9 TYPE 2 DIABETES MELLITUS WITHOUT COMPLICATION, WITHOUT LONG-TERM CURRENT USE OF INSULIN (HCC): Primary | ICD-10-CM

## 2023-12-04 DIAGNOSIS — E78.2 MIXED HYPERLIPIDEMIA: ICD-10-CM

## 2023-12-04 PROCEDURE — 3078F DIAST BP <80 MM HG: CPT | Performed by: INTERNAL MEDICINE

## 2023-12-04 PROCEDURE — G8484 FLU IMMUNIZE NO ADMIN: HCPCS | Performed by: INTERNAL MEDICINE

## 2023-12-04 PROCEDURE — 3075F SYST BP GE 130 - 139MM HG: CPT | Performed by: INTERNAL MEDICINE

## 2023-12-04 PROCEDURE — 1036F TOBACCO NON-USER: CPT | Performed by: INTERNAL MEDICINE

## 2023-12-04 PROCEDURE — 1123F ACP DISCUSS/DSCN MKR DOCD: CPT | Performed by: INTERNAL MEDICINE

## 2023-12-04 PROCEDURE — 99214 OFFICE O/P EST MOD 30 MIN: CPT | Performed by: INTERNAL MEDICINE

## 2023-12-04 PROCEDURE — 3051F HG A1C>EQUAL 7.0%<8.0%: CPT | Performed by: INTERNAL MEDICINE

## 2023-12-04 PROCEDURE — 1090F PRES/ABSN URINE INCON ASSESS: CPT | Performed by: INTERNAL MEDICINE

## 2023-12-04 PROCEDURE — G8419 CALC BMI OUT NRM PARAM NOF/U: HCPCS | Performed by: INTERNAL MEDICINE

## 2023-12-04 PROCEDURE — G8400 PT W/DXA NO RESULTS DOC: HCPCS | Performed by: INTERNAL MEDICINE

## 2023-12-04 PROCEDURE — G8427 DOCREV CUR MEDS BY ELIG CLIN: HCPCS | Performed by: INTERNAL MEDICINE

## 2023-12-04 NOTE — PROGRESS NOTES
Chief Complaint   Patient presents with    Diabetes    Thyroid Problem     Pcp and pharmacy verified     History of Present Illness: Braden Craig is a 82 y.o. female here for follow up of diabetes and hypothyroidism. She was diagnosed with diabetes around 2012. Pt denies any recent illnesses, injuries or hospitalizations. \"I have been trying to eat like I am supposed to. \"    Pt is taking the Glipizide 5mg (1/2 of a 10mg pill) with brunch and 5mg with dinner and Metformin XR 1000mg with breakfast and 1000mg with dinner and Jardiance 10mg daily. Pt is testing her BG every morning. Her FBGs have been running in the 80-120s. Her A1C last week was 7.0%. - She wakes around 10AM  - Her first meal is Noon-1PM, yesterday she had turkey soup. - She will occasionally have some cherries or grapes in the afternoon.  - She has dinner around 6PM, last night she had turkey soup and iced tea. - She notes that in the evening she will snack on crackers or a lollipop. No history of vascular disease. No history of neuropathy, or nephropathy. Last eye exam was April 2023, no retinopathy. \"She said my eyes were looking good. \"  Pt has hx of cataracts removed in 2012. Pt was diagnosed with hypothyroidism in May 2015. She denies issues of tremors, CP, SOB, heat or cold intolerance, diarrhea or constipation. She is still taking her LT4 50mcg every day. She is still taking the Rosuvastatin 10mg HS.     Current Outpatient Medications   Medication Sig    glipiZIDE (GLUCOTROL) 10 MG tablet TAKE 1/2 TABLET TWICE DAILY (WITH LUNCH AND DINNER)    metFORMIN (GLUCOPHAGE-XR) 500 MG extended release tablet TAKE 2 TABLETS EVERY MORNING AND TAKE 2 TABLETS EVERY EVENING    aspirin 81 MG EC tablet Take 1 tablet by mouth daily    empagliflozin (JARDIANCE) 10 MG tablet Take 1 tablet by mouth daily    hydroCHLOROthiazide (HYDRODIURIL) 12.5 MG tablet Take 1 tablet by mouth daily    levothyroxine (SYNTHROID) 50 MCG

## 2024-04-09 RX ORDER — GLIPIZIDE 10 MG/1
TABLET ORAL
Qty: 90 TABLET | Refills: 3 | Status: SHIPPED | OUTPATIENT
Start: 2024-04-09

## 2024-05-31 LAB
ALBUMIN SERPL-MCNC: 4.1 G/DL (ref 3.7–4.7)
ALBUMIN/GLOB SERPL: 1.5 {RATIO} (ref 1.2–2.2)
ALP SERPL-CCNC: 84 IU/L (ref 44–121)
ALT SERPL-CCNC: 13 IU/L (ref 0–32)
AST SERPL-CCNC: 12 IU/L (ref 0–40)
BILIRUB SERPL-MCNC: 0.2 MG/DL (ref 0–1.2)
BUN SERPL-MCNC: 18 MG/DL (ref 8–27)
BUN/CREAT SERPL: 17 (ref 12–28)
CALCIUM SERPL-MCNC: 10.3 MG/DL (ref 8.7–10.3)
CHLORIDE SERPL-SCNC: 104 MMOL/L (ref 96–106)
CHOLEST SERPL-MCNC: 125 MG/DL (ref 100–199)
CO2 SERPL-SCNC: 23 MMOL/L (ref 20–29)
CREAT SERPL-MCNC: 1.09 MG/DL (ref 0.57–1)
EGFRCR SERPLBLD CKD-EPI 2021: 51 ML/MIN/1.73
GLOBULIN SER CALC-MCNC: 2.8 G/DL (ref 1.5–4.5)
GLUCOSE SERPL-MCNC: 83 MG/DL (ref 70–99)
HBA1C MFR BLD: 7.8 % (ref 4.8–5.6)
HDLC SERPL-MCNC: 49 MG/DL
IMP & REVIEW OF LAB RESULTS: NORMAL
LDLC SERPL CALC-MCNC: 59 MG/DL (ref 0–99)
Lab: NORMAL
POTASSIUM SERPL-SCNC: 5.2 MMOL/L (ref 3.5–5.2)
PROT SERPL-MCNC: 6.9 G/DL (ref 6–8.5)
REPORT: NORMAL
REPORT: NORMAL
SODIUM SERPL-SCNC: 143 MMOL/L (ref 134–144)
T4 FREE SERPL-MCNC: 1.64 NG/DL (ref 0.82–1.77)
TRIGL SERPL-MCNC: 88 MG/DL (ref 0–149)
TSH SERPL DL<=0.005 MIU/L-ACNC: 3.54 UIU/ML (ref 0.45–4.5)
VLDLC SERPL CALC-MCNC: 17 MG/DL (ref 5–40)

## 2024-06-01 DIAGNOSIS — E03.9 ACQUIRED HYPOTHYROIDISM: ICD-10-CM

## 2024-06-01 DIAGNOSIS — I10 ESSENTIAL (PRIMARY) HYPERTENSION: ICD-10-CM

## 2024-06-01 DIAGNOSIS — E78.2 MIXED HYPERLIPIDEMIA: ICD-10-CM

## 2024-06-01 DIAGNOSIS — E11.9 TYPE 2 DIABETES MELLITUS WITHOUT COMPLICATION, WITHOUT LONG-TERM CURRENT USE OF INSULIN (HCC): ICD-10-CM

## 2024-06-04 ENCOUNTER — OFFICE VISIT (OUTPATIENT)
Age: 84
End: 2024-06-04
Payer: MEDICARE

## 2024-06-04 VITALS
DIASTOLIC BLOOD PRESSURE: 63 MMHG | BODY MASS INDEX: 26.5 KG/M2 | SYSTOLIC BLOOD PRESSURE: 117 MMHG | HEART RATE: 77 BPM | HEIGHT: 60 IN | WEIGHT: 135 LBS

## 2024-06-04 DIAGNOSIS — E78.2 MIXED HYPERLIPIDEMIA: ICD-10-CM

## 2024-06-04 DIAGNOSIS — I10 ESSENTIAL (PRIMARY) HYPERTENSION: ICD-10-CM

## 2024-06-04 DIAGNOSIS — E03.9 ACQUIRED HYPOTHYROIDISM: ICD-10-CM

## 2024-06-04 DIAGNOSIS — E11.9 TYPE 2 DIABETES MELLITUS WITHOUT COMPLICATION, WITHOUT LONG-TERM CURRENT USE OF INSULIN (HCC): Primary | ICD-10-CM

## 2024-06-04 PROCEDURE — 99214 OFFICE O/P EST MOD 30 MIN: CPT | Performed by: INTERNAL MEDICINE

## 2024-06-04 PROCEDURE — G8427 DOCREV CUR MEDS BY ELIG CLIN: HCPCS | Performed by: INTERNAL MEDICINE

## 2024-06-04 PROCEDURE — G8400 PT W/DXA NO RESULTS DOC: HCPCS | Performed by: INTERNAL MEDICINE

## 2024-06-04 PROCEDURE — 3074F SYST BP LT 130 MM HG: CPT | Performed by: INTERNAL MEDICINE

## 2024-06-04 PROCEDURE — G2211 COMPLEX E/M VISIT ADD ON: HCPCS | Performed by: INTERNAL MEDICINE

## 2024-06-04 PROCEDURE — 3078F DIAST BP <80 MM HG: CPT | Performed by: INTERNAL MEDICINE

## 2024-06-04 PROCEDURE — 1090F PRES/ABSN URINE INCON ASSESS: CPT | Performed by: INTERNAL MEDICINE

## 2024-06-04 PROCEDURE — 1036F TOBACCO NON-USER: CPT | Performed by: INTERNAL MEDICINE

## 2024-06-04 PROCEDURE — G8419 CALC BMI OUT NRM PARAM NOF/U: HCPCS | Performed by: INTERNAL MEDICINE

## 2024-06-04 PROCEDURE — 3051F HG A1C>EQUAL 7.0%<8.0%: CPT | Performed by: INTERNAL MEDICINE

## 2024-06-04 PROCEDURE — 1123F ACP DISCUSS/DSCN MKR DOCD: CPT | Performed by: INTERNAL MEDICINE

## 2024-06-04 RX ORDER — ERYTHROMYCIN 5 MG/G
OINTMENT OPHTHALMIC
COMMUNITY
Start: 2024-04-26

## 2024-06-04 NOTE — PROGRESS NOTES
- 8.5 g/dL 6.9    Albumin      3.7 - 4.7 g/dL 4.1    Globulin, Total      1.5 - 4.5 g/dL 2.8    Albumin/Globulin Ratio      1.2 - 2.2  1.5    Total Bilirubin      0.0 - 1.2 mg/dL 0.2    Alkaline Phosphatase      44 - 121 IU/L 84    AST      0 - 40 IU/L 12    ALT      0 - 32 IU/L 13    Cholesterol, Total      100 - 199 mg/dL 125    Triglycerides      0 - 149 mg/dL 88    HDL Cholesterol      >39 mg/dL 49    VLDL      5 - 40 mg/dL 17    LDL Cholesterol      0 - 99 mg/dL 59    Hemoglobin A1C      4.8 - 5.6 % 7.8 (H)    TSH      0.450 - 4.500 uIU/mL 3.540    T4 Free      0.82 - 1.77 ng/dL 1.64      Assessment/Plan:   1) DM > Her A1C last week was up to 7.8%. Pt to continue the Jardiance 10mg every day, Metformin 1000mg BID and Glipizide 5mg with brunch and 5mg with dinner.  Pt to check her BG daily.    2) Hypothyroidism > Pt is clinically and biochemically euthyroid on LT4 50mcg daily. Pt to continue this current regimen.     3) HTN > Her BP today was 117/63 on HCTZ 12.5mg daily, and Losartan 50mg daily. Will not make any changes at this time.    4) HLD > Pt is on Rosuvastatin 10mg HS which she is tolerating well. Her Lipid panel in May 2024 was at goal.    Pt voices understanding and agreement with the plan.    RTC 6 months    Copy sent to:  Dr. Susannah Humphreys

## 2024-06-14 ENCOUNTER — TELEPHONE (OUTPATIENT)
Age: 84
End: 2024-06-14

## 2024-06-14 NOTE — TELEPHONE ENCOUNTER
Received a call from patient that her sugar was up to 522 around 4pm after eating a lot of watermelon and she realized she had not taken any of her metformin and glipizide over the past 24 hours.  She then took a dose of 2 tabs of metformin and 1/2 tab of glipizide and her sugar was down to 191 by the time we spoke at 6pm.  She was wondering if she needed to go to the ER and I explained that since her sugar had come down under 200, there was no need to go to the ER and likely it went this high simply from eating too much watermelon without any meds in her system.  I advised her to not have any more carbs tonight and drink at least 32 oz of water to flush her system out and go easy on the watermelon going forward and be sure to take her pills as directed everyday and she should be fine.  I advised her to call me if she needed anything over the weekend.  she voiced understanding of this plan.

## 2024-06-15 ENCOUNTER — HOSPITAL ENCOUNTER (EMERGENCY)
Facility: HOSPITAL | Age: 84
Discharge: HOME | End: 2024-06-15
Payer: MEDICARE

## 2024-06-15 VITALS
OXYGEN SATURATION: 99 % | RESPIRATION RATE: 18 BRPM | TEMPERATURE: 98.6 F | HEART RATE: 63 BPM | DIASTOLIC BLOOD PRESSURE: 76 MMHG | SYSTOLIC BLOOD PRESSURE: 122 MMHG

## 2024-06-15 DIAGNOSIS — R79.89 ELEVATED SERUM CREATININE: ICD-10-CM

## 2024-06-15 DIAGNOSIS — R53.83 FATIGUE, UNSPECIFIED TYPE: ICD-10-CM

## 2024-06-15 DIAGNOSIS — E86.0 DEHYDRATION: Primary | ICD-10-CM

## 2024-06-15 LAB
ALBUMIN SERPL-MCNC: 3.3 G/DL (ref 3.5–5)
ALBUMIN/GLOB SERPL: 0.7 (ref 1.1–2.2)
ALP SERPL-CCNC: 71 U/L (ref 45–117)
ALT SERPL-CCNC: 28 U/L (ref 12–78)
ANION GAP SERPL CALC-SCNC: 9 MMOL/L (ref 5–15)
APPEARANCE UR: CLEAR
AST SERPL-CCNC: 32 U/L (ref 15–37)
BACTERIA URNS QL MICRO: NEGATIVE /HPF
BASOPHILS # BLD: 0 K/UL (ref 0–0.1)
BASOPHILS NFR BLD: 0 % (ref 0–1)
BILIRUB SERPL-MCNC: 0.4 MG/DL (ref 0.2–1)
BILIRUB UR QL: NEGATIVE
BUN SERPL-MCNC: 34 MG/DL (ref 6–20)
BUN/CREAT SERPL: 25 (ref 12–20)
CALCIUM SERPL-MCNC: 9.3 MG/DL (ref 8.5–10.1)
CHLORIDE SERPL-SCNC: 98 MMOL/L (ref 97–108)
CO2 SERPL-SCNC: 23 MMOL/L (ref 21–32)
COLOR UR: ABNORMAL
CREAT SERPL-MCNC: 1.37 MG/DL (ref 0.55–1.02)
DIFFERENTIAL METHOD BLD: ABNORMAL
EOSINOPHIL # BLD: 0 K/UL (ref 0–0.4)
EOSINOPHIL NFR BLD: 0 % (ref 0–7)
EPITH CASTS URNS QL MICRO: ABNORMAL /LPF
ERYTHROCYTE [DISTWIDTH] IN BLOOD BY AUTOMATED COUNT: 15.2 % (ref 11.5–14.5)
GLOBULIN SER CALC-MCNC: 4.6 G/DL (ref 2–4)
GLUCOSE BLD STRIP.AUTO-MCNC: 105 MG/DL (ref 65–117)
GLUCOSE SERPL-MCNC: 118 MG/DL (ref 65–100)
GLUCOSE UR STRIP.AUTO-MCNC: >1000 MG/DL
HCT VFR BLD AUTO: 40.4 % (ref 35–47)
HGB BLD-MCNC: 13.1 G/DL (ref 11.5–16)
HGB UR QL STRIP: NEGATIVE
HYALINE CASTS URNS QL MICRO: ABNORMAL /LPF (ref 0–2)
IMM GRANULOCYTES # BLD AUTO: 0 K/UL (ref 0–0.04)
IMM GRANULOCYTES NFR BLD AUTO: 0 % (ref 0–0.5)
KETONES UR QL STRIP.AUTO: 15 MG/DL
LACTATE SERPL-SCNC: 1.7 MMOL/L (ref 0.4–2)
LEUKOCYTE ESTERASE UR QL STRIP.AUTO: NEGATIVE
LIPASE SERPL-CCNC: 31 U/L (ref 13–75)
LYMPHOCYTES # BLD: 1.6 K/UL (ref 0.8–3.5)
LYMPHOCYTES NFR BLD: 29 % (ref 12–49)
MCH RBC QN AUTO: 26 PG (ref 26–34)
MCHC RBC AUTO-ENTMCNC: 32.4 G/DL (ref 30–36.5)
MCV RBC AUTO: 80.3 FL (ref 80–99)
MONOCYTES # BLD: 0.7 K/UL (ref 0–1)
MONOCYTES NFR BLD: 13 % (ref 5–13)
NEUTS SEG # BLD: 3.2 K/UL (ref 1.8–8)
NEUTS SEG NFR BLD: 58 % (ref 32–75)
NITRITE UR QL STRIP.AUTO: NEGATIVE
NRBC # BLD: 0 K/UL (ref 0–0.01)
NRBC BLD-RTO: 0 PER 100 WBC
PH UR STRIP: 5 (ref 5–8)
PLATELET # BLD AUTO: 223 K/UL (ref 150–400)
PMV BLD AUTO: 10.3 FL (ref 8.9–12.9)
POTASSIUM SERPL-SCNC: 3.5 MMOL/L (ref 3.5–5.1)
PROT SERPL-MCNC: 7.9 G/DL (ref 6.4–8.2)
PROT UR STRIP-MCNC: ABNORMAL MG/DL
RBC # BLD AUTO: 5.03 M/UL (ref 3.8–5.2)
RBC #/AREA URNS HPF: ABNORMAL /HPF (ref 0–5)
SERVICE CMNT-IMP: NORMAL
SODIUM SERPL-SCNC: 130 MMOL/L (ref 136–145)
SP GR UR REFRACTOMETRY: 1.02
URINE CULTURE IF INDICATED: ABNORMAL
UROBILINOGEN UR QL STRIP.AUTO: 0.2 EU/DL (ref 0.2–1)
WBC # BLD AUTO: 5.6 K/UL (ref 3.6–11)
WBC URNS QL MICRO: ABNORMAL /HPF (ref 0–4)

## 2024-06-15 PROCEDURE — 81001 URINALYSIS AUTO W/SCOPE: CPT

## 2024-06-15 PROCEDURE — 82962 GLUCOSE BLOOD TEST: CPT

## 2024-06-15 PROCEDURE — 83690 ASSAY OF LIPASE: CPT

## 2024-06-15 PROCEDURE — 80053 COMPREHEN METABOLIC PANEL: CPT

## 2024-06-15 PROCEDURE — 2580000003 HC RX 258

## 2024-06-15 PROCEDURE — 93005 ELECTROCARDIOGRAM TRACING: CPT

## 2024-06-15 PROCEDURE — 99284 EMERGENCY DEPT VISIT MOD MDM: CPT

## 2024-06-15 PROCEDURE — 96361 HYDRATE IV INFUSION ADD-ON: CPT

## 2024-06-15 PROCEDURE — 83605 ASSAY OF LACTIC ACID: CPT

## 2024-06-15 PROCEDURE — 96360 HYDRATION IV INFUSION INIT: CPT

## 2024-06-15 PROCEDURE — 85025 COMPLETE CBC W/AUTO DIFF WBC: CPT

## 2024-06-15 PROCEDURE — 36415 COLL VENOUS BLD VENIPUNCTURE: CPT

## 2024-06-15 RX ORDER — 0.9 % SODIUM CHLORIDE 0.9 %
1000 INTRAVENOUS SOLUTION INTRAVENOUS ONCE
Status: COMPLETED | OUTPATIENT
Start: 2024-06-15 | End: 2024-06-15

## 2024-06-15 RX ADMIN — SODIUM CHLORIDE 1000 ML: 9 INJECTION, SOLUTION INTRAVENOUS at 19:32

## 2024-06-15 ASSESSMENT — PAIN SCALES - GENERAL: PAINLEVEL_OUTOF10: 8

## 2024-06-15 NOTE — ED NOTES
Bedside and Verbal shift change report given to Monty (oncoming nurse) by Naida (offgoing nurse). Report included the following information Nurse Handoff Report, ED Encounter Summary, ED SBAR, Intake/Output, MAR, Recent Results, and Neuro Assessment, and outstanding orders to be completed.

## 2024-06-15 NOTE — ED PROVIDER NOTES
[facility] EMERGENCY DEPT  EMERGENCY DEPARTMENT ENCOUNTER       Pt Name: Naz Castillo  MRN: [id number]  Birthdate 1940  Date of evaluation: 6/15/2024  Provider: Ángela Martínez PA-C   PCP: Susannah Humphreys MD  Note Started: 6:48 PM EDT 6/15/24     CHIEF COMPLAINT       Chief Complaint   Patient presents with    Hypoglycemia    Fatigue    Diarrhea     Pt has had diarrhea today. Pt began with low glucose on Wednesday of . Pt had been feeling fatigue and lost track of her days. Slept all day on Friday. Yesterday her blood sugar was over 500. Has felt out of it. Also has ongoing stomach pains         HISTORY OF PRESENT ILLNESS: 1 or more elements      History From: Patient and Patient's Daughter  HPI Limitations: None     Naz Castillo is a 84 y.o. female who presents to the ED for evaluation of fatigue that began on Wednesday.     Nursing Notes were all reviewed and agreed with or any disagreements were addressed in the HPI.     REVIEW OF SYSTEMS      Review of Systems     Positives and Pertinent negatives as per HPI.    PAST HISTORY     Past Medical History:  Past Medical History:   Diagnosis Date    Diabetes (HCC)     Family history of colon cancer in mother 2016    Mother, over age sixty    Gastrointestinal disorder     GERD    Hypertension     Other ill-defined conditions(799.89)     High Cholesterol    Screen for colon cancer 2016       Past Surgical History:  Past Surgical History:   Procedure Laterality Date    COLONOSCOPY N/A 2016    COLONOSCOPY performed by Moose Yang MD at [facility] ENDOSCOPY    GI      hiatal hernia       Family History:  Family History   Problem Relation Age of Onset    Hypertension Brother     Diabetes Sister     Kidney Disease Brother     Diabetes Brother     Heart Disease Sister          of MI    Lung Disease Father         COPD    Hypertension Father     Liver Disease Mother     Diabetes Mother        Social History:  Social History     Tobacco Use    Smoking

## 2024-06-16 NOTE — ED NOTES
Patient discharged from the ED by MARCY Millan. Diagnosis, medications, precautions and follow-ups were reviewed with the patient/family. Questions were asked and answered prior to departure. Patient departed the ED via wheelchair and was accompanied by daughter.

## 2024-06-18 LAB
EKG ATRIAL RATE: 123 BPM
EKG DIAGNOSIS: NORMAL
EKG P AXIS: 75 DEGREES
EKG P-R INTERVAL: 146 MS
EKG Q-T INTERVAL: 324 MS
EKG QRS DURATION: 88 MS
EKG QTC CALCULATION (BAZETT): 463 MS
EKG R AXIS: -89 DEGREES
EKG T AXIS: 77 DEGREES
EKG VENTRICULAR RATE: 123 BPM

## 2024-08-20 DIAGNOSIS — E11.9 TYPE 2 DIABETES MELLITUS WITHOUT COMPLICATION, WITHOUT LONG-TERM CURRENT USE OF INSULIN (HCC): Primary | ICD-10-CM

## 2024-08-20 RX ORDER — METFORMIN HYDROCHLORIDE 500 MG/1
TABLET, EXTENDED RELEASE ORAL
Qty: 360 TABLET | Refills: 3 | Status: SHIPPED | OUTPATIENT
Start: 2024-08-20

## 2024-10-29 ENCOUNTER — TELEPHONE (OUTPATIENT)
Age: 84
End: 2024-10-29

## 2024-10-29 NOTE — TELEPHONE ENCOUNTER
Patient called stating that she did not receive a shipment of Jardiance from [employer].   I called [employer]. Was told they are changing pharmacies. Many names/prescriptions have gotten lost. No record of patient in their system.  Was advised to have patient resubmit an application. States is about a month behind processing applications.  OK to give patient samples until her application is accepted and processed?

## 2024-10-31 RX ORDER — BLOOD SUGAR DIAGNOSTIC
STRIP MISCELLANEOUS
Qty: 100 EACH | Refills: 3 | Status: SHIPPED | OUTPATIENT
Start: 2024-10-31

## 2024-11-22 ENCOUNTER — TELEPHONE (OUTPATIENT)
Age: 84
End: 2024-11-22

## 2024-11-22 DIAGNOSIS — E03.9 HYPOTHYROIDISM (ACQUIRED): ICD-10-CM

## 2024-11-22 DIAGNOSIS — I10 ESSENTIAL (PRIMARY) HYPERTENSION: ICD-10-CM

## 2024-11-22 DIAGNOSIS — E11.42 TYPE 2 DIABETES MELLITUS WITH DIABETIC POLYNEUROPATHY, UNSPECIFIED WHETHER LONG TERM INSULIN USE (HCC): Primary | ICD-10-CM

## 2024-12-01 DIAGNOSIS — E03.9 ACQUIRED HYPOTHYROIDISM: ICD-10-CM

## 2024-12-01 DIAGNOSIS — I10 ESSENTIAL (PRIMARY) HYPERTENSION: ICD-10-CM

## 2024-12-01 DIAGNOSIS — E78.2 MIXED HYPERLIPIDEMIA: ICD-10-CM

## 2024-12-01 DIAGNOSIS — E11.9 TYPE 2 DIABETES MELLITUS WITHOUT COMPLICATION, WITHOUT LONG-TERM CURRENT USE OF INSULIN (HCC): ICD-10-CM

## 2024-12-03 LAB
ALBUMIN SERPL-MCNC: 4.7 G/DL (ref 3.7–4.7)
ALP SERPL-CCNC: 89 IU/L (ref 44–121)
ALT SERPL-CCNC: 11 IU/L (ref 0–32)
AST SERPL-CCNC: 16 IU/L (ref 0–40)
BILIRUB SERPL-MCNC: 0.3 MG/DL (ref 0–1.2)
BUN SERPL-MCNC: 37 MG/DL (ref 8–27)
BUN/CREAT SERPL: 32 (ref 12–28)
CALCIUM SERPL-MCNC: 10 MG/DL (ref 8.7–10.3)
CHLORIDE SERPL-SCNC: 99 MMOL/L (ref 96–106)
CO2 SERPL-SCNC: 18 MMOL/L (ref 20–29)
CREAT SERPL-MCNC: 1.16 MG/DL (ref 0.57–1)
EGFRCR SERPLBLD CKD-EPI 2021: 47 ML/MIN/1.73
GLOBULIN SER CALC-MCNC: 3.3 G/DL (ref 1.5–4.5)
GLUCOSE SERPL-MCNC: 81 MG/DL (ref 70–99)
HBA1C MFR BLD: 8 % (ref 4.8–5.6)
Lab: NORMAL
POTASSIUM SERPL-SCNC: 4 MMOL/L (ref 3.5–5.2)
PROT SERPL-MCNC: 8 G/DL (ref 6–8.5)
REPORT: NORMAL
SODIUM SERPL-SCNC: 138 MMOL/L (ref 134–144)
T4 FREE SERPL-MCNC: 1.53 NG/DL (ref 0.82–1.77)
TSH SERPL DL<=0.005 MIU/L-ACNC: 3.52 UIU/ML (ref 0.45–4.5)

## 2024-12-04 ENCOUNTER — OFFICE VISIT (OUTPATIENT)
Age: 84
End: 2024-12-04
Payer: MEDICARE

## 2024-12-04 VITALS
HEART RATE: 60 BPM | BODY MASS INDEX: 26.23 KG/M2 | HEIGHT: 60 IN | DIASTOLIC BLOOD PRESSURE: 60 MMHG | SYSTOLIC BLOOD PRESSURE: 110 MMHG | WEIGHT: 133.6 LBS

## 2024-12-04 DIAGNOSIS — E03.9 ACQUIRED HYPOTHYROIDISM: ICD-10-CM

## 2024-12-04 DIAGNOSIS — I10 ESSENTIAL (PRIMARY) HYPERTENSION: ICD-10-CM

## 2024-12-04 DIAGNOSIS — E78.2 MIXED HYPERLIPIDEMIA: ICD-10-CM

## 2024-12-04 DIAGNOSIS — E11.9 TYPE 2 DIABETES MELLITUS WITHOUT COMPLICATION, WITHOUT LONG-TERM CURRENT USE OF INSULIN (HCC): Primary | ICD-10-CM

## 2024-12-04 PROCEDURE — 1159F MED LIST DOCD IN RCRD: CPT | Performed by: INTERNAL MEDICINE

## 2024-12-04 PROCEDURE — G8400 PT W/DXA NO RESULTS DOC: HCPCS | Performed by: INTERNAL MEDICINE

## 2024-12-04 PROCEDURE — 1160F RVW MEDS BY RX/DR IN RCRD: CPT | Performed by: INTERNAL MEDICINE

## 2024-12-04 PROCEDURE — 1126F AMNT PAIN NOTED NONE PRSNT: CPT | Performed by: INTERNAL MEDICINE

## 2024-12-04 PROCEDURE — 99214 OFFICE O/P EST MOD 30 MIN: CPT | Performed by: INTERNAL MEDICINE

## 2024-12-04 PROCEDURE — 1123F ACP DISCUSS/DSCN MKR DOCD: CPT | Performed by: INTERNAL MEDICINE

## 2024-12-04 PROCEDURE — G8427 DOCREV CUR MEDS BY ELIG CLIN: HCPCS | Performed by: INTERNAL MEDICINE

## 2024-12-04 PROCEDURE — G8484 FLU IMMUNIZE NO ADMIN: HCPCS | Performed by: INTERNAL MEDICINE

## 2024-12-04 PROCEDURE — 1036F TOBACCO NON-USER: CPT | Performed by: INTERNAL MEDICINE

## 2024-12-04 PROCEDURE — 3074F SYST BP LT 130 MM HG: CPT | Performed by: INTERNAL MEDICINE

## 2024-12-04 PROCEDURE — 1090F PRES/ABSN URINE INCON ASSESS: CPT | Performed by: INTERNAL MEDICINE

## 2024-12-04 PROCEDURE — G8419 CALC BMI OUT NRM PARAM NOF/U: HCPCS | Performed by: INTERNAL MEDICINE

## 2024-12-04 PROCEDURE — 3052F HG A1C>EQUAL 8.0%<EQUAL 9.0%: CPT | Performed by: INTERNAL MEDICINE

## 2024-12-04 PROCEDURE — G2211 COMPLEX E/M VISIT ADD ON: HCPCS | Performed by: INTERNAL MEDICINE

## 2024-12-04 PROCEDURE — 3078F DIAST BP <80 MM HG: CPT | Performed by: INTERNAL MEDICINE

## 2024-12-04 RX ORDER — MECLIZINE HYDROCHLORIDE 25 MG/1
25 TABLET ORAL 3 TIMES DAILY PRN
COMMUNITY
Start: 2024-10-30

## 2024-12-04 RX ORDER — LANCETS
EACH MISCELLANEOUS
COMMUNITY
Start: 2024-09-26

## 2024-12-04 NOTE — PROGRESS NOTES
blood sugar once daily. DX:E11.9    metFORMIN (GLUCOPHAGE-XR) 500 MG extended release tablet TAKE 2 TABLETS EVERY MORNING AND TAKE 2 TABLETS EVERY EVENING    glipiZIDE (GLUCOTROL) 10 MG tablet TAKE 1/2 TABLET TWICE DAILY (WITH LUNCH AND DINNER)    aspirin 81 MG EC tablet Take 1 tablet by mouth daily    hydroCHLOROthiazide (HYDRODIURIL) 12.5 MG tablet Take 1 tablet by mouth daily    levothyroxine (SYNTHROID) 50 MCG tablet Take 1 tablet by mouth every morning (before breakfast)    losartan (COZAAR) 50 MG tablet TAKE 1 TABLET EVERY DAY    omeprazole (PRILOSEC) 40 MG delayed release capsule ceived the following from [facility]: Outside name: omeprazole (PRILOSEC) 40 mg capsule    ondansetron (ZOFRAN) 4 MG tablet Take 1 tablet by mouth every 8 hours as needed    rosuvastatin (CRESTOR) 10 MG tablet daily    empagliflozin (JARDIANCE) 10 MG tablet Take 1 tab daily     No current facility-administered medications for this visit.     No Known Allergies  Review of Systems:  - Eyes: no blurry vision or double vision  - Cardiovascular: no chest pain  - Respiratory: no shortness of breath  - Musculoskeletal: no myalgias  - Neurological: no numbness/tingling in extremities    Physical Examination:  Blood pressure 110/60, pulse 60, height 1.524 m (5'), weight 60.6 kg (133 lb 9.6 oz).  General: pleasant, no distress, good eye contact   Neck: no carotid bruits  Cardiovascular: regular, normal rate, nl s1 and s2, no m/r/g, 2+ DP pulses   Respiratory: clear bilaterally  Integumentary: no edema, no foot ulcers,   Psychiatric: normal mood and affect    Diabetic foot exam:   Left Foot:   Visual Exam: normal   Pulse DP: 2+ (normal)   Filament test: normal sensation   Vibratory Sensation: normal  Right Foot:   Visual Exam: normal   Pulse DP: 2+ (normal)   Filament test: normal sensation   Vibratory Sensation: normal      Data Reviewed:   Component      Latest Ref Rng 12/2/2024   Glucose      70 - 99 mg/dL 81    BUN,BUNPL

## 2024-12-04 NOTE — TELEPHONE ENCOUNTER
Patient states has not heard from [employer]. Almost out of Jardiance 10 mg.  Verbal permission obtained from  to give samples.

## 2025-02-06 RX ORDER — GLIPIZIDE 10 MG/1
TABLET ORAL
Qty: 90 TABLET | Refills: 1 | Status: SHIPPED | OUTPATIENT
Start: 2025-02-06

## 2025-06-01 DIAGNOSIS — E78.2 MIXED HYPERLIPIDEMIA: ICD-10-CM

## 2025-06-01 DIAGNOSIS — E11.9 TYPE 2 DIABETES MELLITUS WITHOUT COMPLICATION, WITHOUT LONG-TERM CURRENT USE OF INSULIN (HCC): ICD-10-CM

## 2025-06-01 DIAGNOSIS — E03.9 ACQUIRED HYPOTHYROIDISM: ICD-10-CM

## 2025-06-01 DIAGNOSIS — I10 ESSENTIAL (PRIMARY) HYPERTENSION: ICD-10-CM

## 2025-06-04 LAB
ALBUMIN SERPL-MCNC: 4.3 G/DL (ref 3.7–4.7)
ALP SERPL-CCNC: 79 IU/L (ref 44–121)
ALT SERPL-CCNC: 11 IU/L (ref 0–32)
AST SERPL-CCNC: 15 IU/L (ref 0–40)
BILIRUB SERPL-MCNC: 0.3 MG/DL (ref 0–1.2)
BUN SERPL-MCNC: 29 MG/DL (ref 8–27)
BUN/CREAT SERPL: 23 (ref 12–28)
CALCIUM SERPL-MCNC: 10.1 MG/DL (ref 8.7–10.3)
CHLORIDE SERPL-SCNC: 99 MMOL/L (ref 96–106)
CHOLEST SERPL-MCNC: 129 MG/DL (ref 100–199)
CO2 SERPL-SCNC: 21 MMOL/L (ref 20–29)
CREAT SERPL-MCNC: 1.27 MG/DL (ref 0.57–1)
EGFRCR SERPLBLD CKD-EPI 2021: 42 ML/MIN/1.73
GLOBULIN SER CALC-MCNC: 3 G/DL (ref 1.5–4.5)
GLUCOSE SERPL-MCNC: 85 MG/DL (ref 70–99)
HBA1C MFR BLD: 7.5 % (ref 4.8–5.6)
HDLC SERPL-MCNC: 45 MG/DL
IMP & REVIEW OF LAB RESULTS: NORMAL
LDLC SERPL CALC-MCNC: 56 MG/DL (ref 0–99)
Lab: NORMAL
POTASSIUM SERPL-SCNC: 4.8 MMOL/L (ref 3.5–5.2)
PROT SERPL-MCNC: 7.3 G/DL (ref 6–8.5)
REPORT: NORMAL
REPORT: NORMAL
SODIUM SERPL-SCNC: 137 MMOL/L (ref 134–144)
T4 FREE SERPL-MCNC: 1.36 NG/DL (ref 0.82–1.77)
TRIGL SERPL-MCNC: 167 MG/DL (ref 0–149)
TSH SERPL DL<=0.005 MIU/L-ACNC: 4.6 UIU/ML (ref 0.45–4.5)
VLDLC SERPL CALC-MCNC: 28 MG/DL (ref 5–40)

## 2025-06-05 ENCOUNTER — OFFICE VISIT (OUTPATIENT)
Age: 85
End: 2025-06-05
Payer: MEDICARE

## 2025-06-05 VITALS
SYSTOLIC BLOOD PRESSURE: 110 MMHG | WEIGHT: 133 LBS | HEART RATE: 71 BPM | BODY MASS INDEX: 26.11 KG/M2 | HEIGHT: 60 IN | DIASTOLIC BLOOD PRESSURE: 74 MMHG

## 2025-06-05 DIAGNOSIS — E03.9 ACQUIRED HYPOTHYROIDISM: ICD-10-CM

## 2025-06-05 DIAGNOSIS — E78.2 MIXED HYPERLIPIDEMIA: ICD-10-CM

## 2025-06-05 DIAGNOSIS — E11.9 TYPE 2 DIABETES MELLITUS WITHOUT COMPLICATION, WITHOUT LONG-TERM CURRENT USE OF INSULIN (HCC): Primary | ICD-10-CM

## 2025-06-05 DIAGNOSIS — I10 ESSENTIAL (PRIMARY) HYPERTENSION: ICD-10-CM

## 2025-06-05 PROCEDURE — G8419 CALC BMI OUT NRM PARAM NOF/U: HCPCS | Performed by: INTERNAL MEDICINE

## 2025-06-05 PROCEDURE — G8400 PT W/DXA NO RESULTS DOC: HCPCS | Performed by: INTERNAL MEDICINE

## 2025-06-05 PROCEDURE — 99214 OFFICE O/P EST MOD 30 MIN: CPT | Performed by: INTERNAL MEDICINE

## 2025-06-05 PROCEDURE — 1159F MED LIST DOCD IN RCRD: CPT | Performed by: INTERNAL MEDICINE

## 2025-06-05 PROCEDURE — 3078F DIAST BP <80 MM HG: CPT | Performed by: INTERNAL MEDICINE

## 2025-06-05 PROCEDURE — 1090F PRES/ABSN URINE INCON ASSESS: CPT | Performed by: INTERNAL MEDICINE

## 2025-06-05 PROCEDURE — 1123F ACP DISCUSS/DSCN MKR DOCD: CPT | Performed by: INTERNAL MEDICINE

## 2025-06-05 PROCEDURE — 3074F SYST BP LT 130 MM HG: CPT | Performed by: INTERNAL MEDICINE

## 2025-06-05 PROCEDURE — G2211 COMPLEX E/M VISIT ADD ON: HCPCS | Performed by: INTERNAL MEDICINE

## 2025-06-05 PROCEDURE — 1126F AMNT PAIN NOTED NONE PRSNT: CPT | Performed by: INTERNAL MEDICINE

## 2025-06-05 PROCEDURE — G8427 DOCREV CUR MEDS BY ELIG CLIN: HCPCS | Performed by: INTERNAL MEDICINE

## 2025-06-05 PROCEDURE — 1160F RVW MEDS BY RX/DR IN RCRD: CPT | Performed by: INTERNAL MEDICINE

## 2025-06-05 PROCEDURE — 3051F HG A1C>EQUAL 7.0%<8.0%: CPT | Performed by: INTERNAL MEDICINE

## 2025-06-05 PROCEDURE — 1036F TOBACCO NON-USER: CPT | Performed by: INTERNAL MEDICINE

## 2025-06-05 NOTE — PROGRESS NOTES
Chief Complaint   Patient presents with    Diabetes    Thyroid Problem     Pcp and pharmacy verified     History of Present Illness: Naz Castillo is a 85 y.o. female here for follow up of diabetes and hypothyroidism. She was diagnosed with diabetes around 2012, when her A1C >10%.    Pt denies any recent illnesses, injuries or hospitalizations.    Pt is taking the Glipizide 5mg (1/2 of a 10mg pill) with brunch and 5mg with dinner and Metformin XR 1000mg with brunch and 1000mg with dinner and Jardiance 10mg daily.    Pt is testing her BG every morning. Her FBGs have been running in the 70-130s.  \"I know I have been eating some of the wrong things, but I try not to eat as much.\"    Her A1C last week was 7.5%.    - She wakes around 10AM  - Her first meal is Noon-1PM, yesterday she had a cheese burger, fries and diet soda.          - She will have an afternoon snack of fruit.   - She has dinner around 730-830AM, last night she had mixed vegetables, spinach, steak and diet soda.       - She notes that in the evening she will snack on chips, crackers or fruit.     No history of vascular disease. No history of neuropathy, or nephropathy.      Last eye exam was May 2025, no retinopathy. \"She said my eyes were looking good.\"  Pt has hx of cataracts removed in 2012.     Pt was diagnosed with hypothyroidism in May 2015.  She denies issues of tremors, CP, SOB, heat or cold intolerance, diarrhea or constipation.  She is still taking her Levothyroxine 50mcg every day.    She is still taking the Rosuvastatin 10mg HS.    Current Outpatient Medications   Medication Sig    glipiZIDE (GLUCOTROL) 10 MG tablet TAKE 1/2 TABLET TWICE DAILY (WITH LUNCH AND DINNER)    meclizine (ANTIVERT) 25 MG tablet Take 1 tablet by mouth 3 times daily as needed    Accu-Chek Softclix Lancets MISC     empagliflozin (JARDIANCE) 10 MG tablet Take 1 tab daily    blood glucose test strips (ACCU-CHEK ANJELICA PLUS) strip Check blood sugar once daily. DX:E11.9

## 2025-06-26 DIAGNOSIS — E11.9 TYPE 2 DIABETES MELLITUS WITHOUT COMPLICATION, WITHOUT LONG-TERM CURRENT USE OF INSULIN (HCC): ICD-10-CM

## 2025-06-26 RX ORDER — METFORMIN HYDROCHLORIDE 500 MG/1
TABLET, EXTENDED RELEASE ORAL
Qty: 360 TABLET | Refills: 1 | Status: SHIPPED | OUTPATIENT
Start: 2025-06-26

## 2025-07-02 RX ORDER — GLIPIZIDE 10 MG/1
TABLET ORAL
Qty: 90 TABLET | Refills: 1 | Status: SHIPPED | OUTPATIENT
Start: 2025-07-02

## 2025-08-08 RX ORDER — EMPAGLIFLOZIN 10 MG/1
10 TABLET, FILM COATED ORAL DAILY
Qty: 90 TABLET | Refills: 1 | Status: ACTIVE | OUTPATIENT
Start: 2025-08-08